# Patient Record
Sex: MALE | NOT HISPANIC OR LATINO | Employment: FULL TIME | URBAN - METROPOLITAN AREA
[De-identification: names, ages, dates, MRNs, and addresses within clinical notes are randomized per-mention and may not be internally consistent; named-entity substitution may affect disease eponyms.]

---

## 2018-03-02 ENCOUNTER — OFFICE VISIT (OUTPATIENT)
Dept: INTERNAL MEDICINE | Facility: CLINIC | Age: 33
End: 2018-03-02
Payer: COMMERCIAL

## 2018-03-02 VITALS
HEIGHT: 68 IN | TEMPERATURE: 98.4 F | OXYGEN SATURATION: 94 % | WEIGHT: 220 LBS | SYSTOLIC BLOOD PRESSURE: 114 MMHG | HEART RATE: 83 BPM | DIASTOLIC BLOOD PRESSURE: 76 MMHG | BODY MASS INDEX: 33.34 KG/M2

## 2018-03-02 DIAGNOSIS — Z13.6 CARDIOVASCULAR SCREENING; LDL GOAL LESS THAN 130: ICD-10-CM

## 2018-03-02 DIAGNOSIS — Z00.00 ROUTINE GENERAL MEDICAL EXAMINATION AT A HEALTH CARE FACILITY: Primary | ICD-10-CM

## 2018-03-02 DIAGNOSIS — F17.200 SMOKER: ICD-10-CM

## 2018-03-02 PROCEDURE — 36415 COLL VENOUS BLD VENIPUNCTURE: CPT | Performed by: NURSE PRACTITIONER

## 2018-03-02 PROCEDURE — 80053 COMPREHEN METABOLIC PANEL: CPT | Performed by: NURSE PRACTITIONER

## 2018-03-02 PROCEDURE — 80061 LIPID PANEL: CPT | Performed by: NURSE PRACTITIONER

## 2018-03-02 PROCEDURE — 99385 PREV VISIT NEW AGE 18-39: CPT | Performed by: NURSE PRACTITIONER

## 2018-03-02 NOTE — PROGRESS NOTES
SUBJECTIVE:   CC: Hal Landa is an 32 year old male who presents for preventative health visit.     Physical   Annual:     Getting at least 3 servings of Calcium per day::  Yes    Bi-annual eye exam::  NO    Dental care twice a year::  NO    Sleep apnea or symptoms of sleep apnea::  Excessive snoring    Diet::  Regular (no restrictions)    Taking medications regularly::  Yes    Medication side effects::  None    Additional concerns today::  No            Needs biometric screen done   No complaints       Smoker   Not willing to quit  Discussion had regarding options and effect second hand smoke has on his 3 children   Wife wants to have him quit           Today's PHQ-2 Score:   PHQ-2 ( 1999 [employer]) 3/2/2018   Q1: Little interest or pleasure in doing things 0   Q2: Feeling down, depressed or hopeless 0   PHQ-2 Score 0   Q1: Little interest or pleasure in doing things Not at all   Q2: Feeling down, depressed or hopeless Not at all   PHQ-2 Score 0       Abuse: Current or Past(Physical, Sexual or Emotional)- No  Do you feel safe in your environment - Yes    Social History   Substance Use Topics     Smoking status: Current Every Day Smoker     Packs/day: 0.25     Smokeless tobacco: Never Used     Alcohol use Yes      Comment: sometimes     Alcohol Use 3/2/2018   AUDIT SCORE  7       Last PSA: No results found for: PSA    Reviewed orders with patient. Reviewed health maintenance and updated orders accordingly - Yes      Reviewed and updated as needed this visit by clinical staff  Tobacco  Allergies  Meds  Med Hx  Surg Hx  Fam Hx  Soc Hx        Reviewed and updated as needed this visit by Provider  Allergies            Review of Systems  C: NEGATIVE for fever, chills, change in weight  I: NEGATIVE for worrisome rashes, moles or lesions  E: NEGATIVE for vision changes or irritation  ENT: NEGATIVE for ear, mouth and throat problems  R: NEGATIVE for significant cough or SOB  CV: NEGATIVE for chest pain,  "palpitations or peripheral edema  GI: NEGATIVE for nausea, abdominal pain, heartburn, or change in bowel habits   male: negative for dysuria, hematuria, decreased urinary stream, erectile dysfunction, urethral discharge  M: NEGATIVE for significant arthralgias or myalgia  N: NEGATIVE for weakness, dizziness or paresthesias  P: NEGATIVE for changes in mood or affect    OBJECTIVE:   /76 (BP Location: Left arm, Patient Position: Sitting, Cuff Size: Adult Large)  Pulse 83  Temp 98.4  F (36.9  C) (Oral)  Ht 5' 8\" (1.727 m)  Wt 220 lb (99.8 kg)  SpO2 94%  BMI 33.45 kg/m2    Physical Exam  GENERAL: alert and no distress  EYES: Eyes grossly normal to inspection,  and conjunctivae and sclerae normal  HENT: ear canals and TM's normal, nose and mouth without ulcers or lesions  NECK: no adenopathy, no asymmetry, masses, or scars and thyroid normal to palpation  RESP: lungs clear to auscultation - no rales, rhonchi or wheezes  CV: regular rate and rhythm, normal S1 S2, no S3 or S4, no murmur, click or rub, no peripheral edema   ABDOMEN: soft, nontender, no hepatosplenomegaly, no masses and bowel sounds normal  MS: no gross musculoskeletal defects noted, no edema  SKIN: no suspicious lesions or rashes  NEURO: Normal strength and tone, mentation intact and speech normal  PSYCH: mentation appears normal, affect normal/bright    ASSESSMENT/PLAN:       ICD-10-CM    1. Routine general medical examination at a health care facility Z00.00 Lipid panel reflex to direct LDL Fasting     Comprehensive metabolic panel   2. Smoker F17.200    3. CARDIOVASCULAR SCREENING; LDL GOAL LESS THAN 130 Z13.6          COUNSELING:   Reviewed preventive health counseling, as reflected in patient instructions       Regular exercise       Healthy diet/nutrition       Osteoporosis Prevention/Bone Health         reports that he has been smoking.  He has been smoking about 0.25 packs per day. He has never used smokeless tobacco.  Tobacco Cessation " "Action Plan: Information offered: Patient not interested at this time  Estimated body mass index is 33.45 kg/(m^2) as calculated from the following:    Height as of this encounter: 5' 8\" (1.727 m).    Weight as of this encounter: 220 lb (99.8 kg).   Weight management plan: Discussed healthy diet and exercise guidelines and patient will follow up in 12 months in clinic to re-evaluate.    Counseling Resources:  ATP IV Guidelines  Pooled Cohorts Equation Calculator  FRAX Risk Assessment  ICSI Preventive Guidelines  Dietary Guidelines for Americans, 2010  USDA's MyPlate  ASA Prophylaxis  Lung CA Screening    ANNE Perez [facility]  Answers for HPI/ROS submitted by the patient on 3/2/2018   PHQ-2 Score: 0    "

## 2018-03-02 NOTE — MR AVS SNAPSHOT
"              After Visit Summary   3/2/2018    Hal Landa    MRN: [id number]           Patient Information     Date Of Birth          1985        Visit Information        Provider Department      3/2/2018 10:40 AM Deborah Chacon APRN CNP [facility]        Today's Diagnoses     Routine general medical examination at a health care facility    -  1    Smoker        CARDIOVASCULAR SCREENING; LDL GOAL LESS THAN 130          Care Instructions    Lab in [address]          Follow-ups after your visit        Who to contact     If you have questions or need follow up information about today's clinic visit or your schedule please contact [facility] directly at [phone].  Normal or non-critical lab and imaging results will be communicated to you by MyChart, letter or phone within 4 business days after the clinic has received the results. If you do not hear from us within 7 days, please contact the clinic through MyChart or phone. If you have a critical or abnormal lab result, we will notify you by phone as soon as possible.  Submit refill requests through [employer] or call your pharmacy and they will forward the refill request to us. Please allow 3 business days for your refill to be completed.          Additional Information About Your Visit        MyChart Information     [employer] lets you send messages to your doctor, view your test results, renew your prescriptions, schedule appointments and more. To sign up, go to [email or web address] . Click on \"Log in\" on the left side of the screen, which will take you to the Welcome page. Then click on \"Sign up Now\" on the right side of the page.     You will be asked to enter the access code listed below, as well as some personal information. Please follow the directions to create your username and password.     Your access code is: [email or web address]  Expires: 2018 11:03 AM     Your access code will  in 90 days. If you " "need help or a new code, please call your [place] clinic or [phone].        Care EveryWhere ID     This is your Care EveryWhere ID. This could be used by other organizations to access your [place] medical records  [id number]        Your Vitals Were     Pulse Temperature Height Pulse Oximetry BMI (Body Mass Index)       83 98.4  F (36.9  C) (Oral) 5' 8\" (1.727 m) 94% 33.45 kg/m2        Blood Pressure from Last 3 Encounters:   03/02/18 114/76   10/30/11 125/79    Weight from Last 3 Encounters:   03/02/18 220 lb (99.8 kg)   10/30/11 185 lb (83.9 kg)              We Performed the Following     Comprehensive metabolic panel     Lipid panel reflex to direct LDL Fasting        Primary Care Provider Office Phone # Fax #    Deborah Zeng Ines, APRN [facility] [phone]       [address] [facility] [address]        Equal Access to Services     [facility] KATHERINE : Hadii aad ku hadasho Soomaali, waaxda luqadaha, qaybta kaalmada adeegyada, waxay idiin hayaan ashleyeg rubinaaratolu demarco . So [facility] [phone].    ATENCIÓN: Si habla español, tiene a vásquez disposición servicios gratuitos de asistencia lingüística. Llame al [phone].    We comply with applicable federal civil rights laws and [place] laws. We do not discriminate on the basis of race, color, national origin, age, disability, sex, sexual orientation, or gender identity.            Thank you!     Thank you for choosing [facility]  for your care. Our goal is always to provide you with excellent care. Hearing back from our patients is one way we can continue to improve our services. Please take a few minutes to complete the written survey that you may receive in the mail after your visit with us. Thank you!             Your Updated Medication List - Protect others around you: Learn how to safely use, store and throw away your medicines at www.disposemymeds.org.      Notice  As of 3/2/2018 11:03 AM    You have not been prescribed any " medications.

## 2018-03-02 NOTE — NURSING NOTE
"Chief Complaint   Patient presents with     Physical       Initial /76 (BP Location: Left arm, Patient Position: Sitting, Cuff Size: Adult Large)  Pulse 83  Temp 98.4  F (36.9  C) (Oral)  Ht 5' 8\" (1.727 m)  Wt 220 lb (99.8 kg)  SpO2 94%  BMI 33.45 kg/m2 Estimated body mass index is 33.45 kg/(m^2) as calculated from the following:    Height as of this encounter: 5' 8\" (1.727 m).    Weight as of this encounter: 220 lb (99.8 kg).  Medication Reconciliation: complete    "

## 2018-03-03 ENCOUNTER — TELEPHONE (OUTPATIENT)
Dept: INTERNAL MEDICINE | Facility: CLINIC | Age: 33
End: 2018-03-03

## 2018-03-03 LAB
ALBUMIN SERPL-MCNC: 4.2 G/DL (ref 3.4–5)
ALP SERPL-CCNC: 84 U/L (ref 40–150)
ALT SERPL W P-5'-P-CCNC: 551 U/L (ref 0–70)
ANION GAP SERPL CALCULATED.3IONS-SCNC: 8 MMOL/L (ref 3–14)
AST SERPL W P-5'-P-CCNC: 221 U/L (ref 0–45)
BILIRUB SERPL-MCNC: 0.4 MG/DL (ref 0.2–1.3)
BUN SERPL-MCNC: 11 MG/DL (ref 7–30)
CALCIUM SERPL-MCNC: 9 MG/DL (ref 8.5–10.1)
CHLORIDE SERPL-SCNC: 106 MMOL/L (ref 94–109)
CHOLEST SERPL-MCNC: 197 MG/DL
CO2 SERPL-SCNC: 26 MMOL/L (ref 20–32)
CREAT SERPL-MCNC: 0.78 MG/DL (ref 0.66–1.25)
GFR SERPL CREATININE-BSD FRML MDRD: >90 ML/MIN/1.7M2
GLUCOSE SERPL-MCNC: 116 MG/DL (ref 70–99)
HDLC SERPL-MCNC: 48 MG/DL
LDLC SERPL CALC-MCNC: 105 MG/DL
NONHDLC SERPL-MCNC: 149 MG/DL
POTASSIUM SERPL-SCNC: 4 MMOL/L (ref 3.4–5.3)
PROT SERPL-MCNC: 8.2 G/DL (ref 6.8–8.8)
SODIUM SERPL-SCNC: 140 MMOL/L (ref 133–144)
TRIGL SERPL-MCNC: 221 MG/DL

## 2018-03-03 NOTE — TELEPHONE ENCOUNTER
MD on call  Lab called that ALT is 551.    We will wait for Deborah Chacon NP to review the results.  She is back in the office on March 6.    I reviewed the last note.  Patient did not have any abdominal complaints.  EtOH score was 7

## 2018-03-06 DIAGNOSIS — R79.89 ELEVATED LFTS: Primary | ICD-10-CM

## 2018-03-06 DIAGNOSIS — R73.09 ELEVATED GLUCOSE: ICD-10-CM

## 2018-03-07 NOTE — TELEPHONE ENCOUNTER
Called and spoke to wife and directed them to be seen in ER related to labs   She will talk to  and decide

## 2018-03-07 NOTE — TELEPHONE ENCOUNTER
Liver tests are elevated   Need to repeat with no alcohol or tylenol   Also do a liver ultrasound     Does she have any pain in abdomen or any complaints?

## 2018-03-07 NOTE — TELEPHONE ENCOUNTER
Called home/cell number which has been changed or disconnected per recording.  Called wife's number (EC) and left message with her to have pt call back for important message.  EDNA Cruz R.N.

## 2019-03-28 ENCOUNTER — OFFICE VISIT (OUTPATIENT)
Dept: PEDIATRICS | Facility: CLINIC | Age: 34
End: 2019-03-28
Payer: COMMERCIAL

## 2019-03-28 VITALS
WEIGHT: 218.5 LBS | TEMPERATURE: 97.5 F | HEART RATE: 80 BPM | DIASTOLIC BLOOD PRESSURE: 76 MMHG | BODY MASS INDEX: 33.12 KG/M2 | OXYGEN SATURATION: 96 % | HEIGHT: 68 IN | SYSTOLIC BLOOD PRESSURE: 120 MMHG

## 2019-03-28 DIAGNOSIS — E11.9 TYPE 2 DIABETES MELLITUS WITHOUT COMPLICATION, WITHOUT LONG-TERM CURRENT USE OF INSULIN (H): ICD-10-CM

## 2019-03-28 DIAGNOSIS — R74.8 ELEVATED LIVER ENZYMES: Primary | ICD-10-CM

## 2019-03-28 DIAGNOSIS — Z00.00 PREVENTATIVE HEALTH CARE: ICD-10-CM

## 2019-03-28 LAB
ERYTHROCYTE [DISTWIDTH] IN BLOOD BY AUTOMATED COUNT: 12.1 % (ref 10–15)
HBA1C MFR BLD: 6.6 % (ref 0–5.6)
HCT VFR BLD AUTO: 50.4 % (ref 40–53)
HGB BLD-MCNC: 17.3 G/DL (ref 13.3–17.7)
MCH RBC QN AUTO: 31.5 PG (ref 26.5–33)
MCHC RBC AUTO-ENTMCNC: 34.3 G/DL (ref 31.5–36.5)
MCV RBC AUTO: 92 FL (ref 78–100)
PLATELET # BLD AUTO: 273 10E9/L (ref 150–450)
RBC # BLD AUTO: 5.5 10E12/L (ref 4.4–5.9)
WBC # BLD AUTO: 8 10E9/L (ref 4–11)

## 2019-03-28 PROCEDURE — 80061 LIPID PANEL: CPT | Performed by: FAMILY MEDICINE

## 2019-03-28 PROCEDURE — 85027 COMPLETE CBC AUTOMATED: CPT | Performed by: FAMILY MEDICINE

## 2019-03-28 PROCEDURE — 36415 COLL VENOUS BLD VENIPUNCTURE: CPT | Performed by: FAMILY MEDICINE

## 2019-03-28 PROCEDURE — 99395 PREV VISIT EST AGE 18-39: CPT | Performed by: FAMILY MEDICINE

## 2019-03-28 PROCEDURE — 82977 ASSAY OF GGT: CPT | Performed by: FAMILY MEDICINE

## 2019-03-28 PROCEDURE — 80053 COMPREHEN METABOLIC PANEL: CPT | Performed by: FAMILY MEDICINE

## 2019-03-28 PROCEDURE — 83036 HEMOGLOBIN GLYCOSYLATED A1C: CPT | Performed by: FAMILY MEDICINE

## 2019-03-28 PROCEDURE — 99213 OFFICE O/P EST LOW 20 MIN: CPT | Mod: 25 | Performed by: FAMILY MEDICINE

## 2019-03-28 ASSESSMENT — MIFFLIN-ST. JEOR: SCORE: 1900.61

## 2019-03-28 NOTE — PROGRESS NOTES
SUBJECTIVE:   CC: Hal Landa is an 33 year old male who presents for preventive health visit.     Healthy Habits:    Do you get at least three servings of calcium containing foods daily (dairy, green leafy vegetables, etc.)? Yes at least 2 servings     Amount of exercise or daily activities, outside of work: none    Problems taking medications regularly No    Medication side effects: No    Have you had an eye exam in the past two years? no    Do you see a dentist twice per year? Yes at least once a year    Do you have sleep apnea, excessive snoring or daytime drowsiness?no    1) elevated liver enzymes last year - opted not to do the imaging.   Discussed alcohol intake previously approximately 2 days a week of having more than 7 beers - discussed . Denies yellowing of the eyes/skin.   Was born in [place]. No known family members with hepatitis.     2) Would like physician screening form done today for work. Had coffee with creamer this morning a few hours ago.     Today's PHQ-2 Score:   PHQ-2 ( 1999 Pfizer) 3/28/2019 3/2/2018   Q1: Little interest or pleasure in doing things 0 0   Q2: Feeling down, depressed or hopeless 0 0   PHQ-2 Score 0 0   Q1: Little interest or pleasure in doing things - Not at all   Q2: Feeling down, depressed or hopeless - Not at all   PHQ-2 Score Incomplete 0     Abuse: Current or Past(Physical, Sexual or Emotional)- No  Do you feel safe in your environment? Yes    Social History     Tobacco Use     Smoking status: Current Every Day Smoker     Packs/day: 0.25     Smokeless tobacco: Never Used   Substance Use Topics     Alcohol use: Yes     Comment: sometimes     If you drink alcohol do you typically have >3 drinks per day or >7 drinks per week? No                      Last PSA: No results found for: PSA    Reviewed orders with patient. Reviewed health maintenance and updated orders accordingly - Yes  Labs reviewed in EPIC    Reviewed and updated as needed this visit by clinical  "staff  Tobacco  Allergies  Meds  Med Hx  Surg Hx  Fam Hx  Soc Hx        Reviewed and updated as needed this visit by Provider           ROS:  CONSTITUTIONAL: NEGATIVE for fever, chills, change in weight  INTEGUMENTARY/SKIN: NEGATIVE for worrisome rashes, moles or lesions  EYES: NEGATIVE for vision changes or irritation  ENT: NEGATIVE for ear, mouth and throat problems  RESP: NEGATIVE for significant cough or SOB  CV: NEGATIVE for chest pain, palpitations or peripheral edema  GI: NEGATIVE for nausea, abdominal pain, heartburn, or change in bowel habits   male: negative for dysuria, hematuria, decreased urinary stream, erectile dysfunction, urethral discharge  MUSCULOSKELETAL: NEGATIVE for significant arthralgias or myalgia  NEURO: NEGATIVE for weakness, dizziness or paresthesias  PSYCHIATRIC: NEGATIVE for changes in mood or affect    OBJECTIVE:   /76 (BP Location: Right arm, Patient Position: Chair, Cuff Size: Adult Large)   Pulse 80   Temp 97.5  F (36.4  C) (Oral)   Ht 1.727 m (5' 8\")   Wt 99.1 kg (218 lb 8 oz)   SpO2 96%   BMI 33.22 kg/m    EXAM:  GENERAL: healthy, alert and no distress  EYES: Eyes grossly normal to inspection, PERRL and conjunctivae and sclerae normal  HENT: normal external ears and  nose and mouth without ulcers or lesions  NECK: no asymmetry, masses, or scars and thyroid normal to palpation  RESP: lungs clear to auscultation - no rales, rhonchi or wheezes  CV: RRR no m/r/g  ABDOMEN: soft, obese, no hepatosplenomegaly  MS: no gross musculoskeletal defects noted, no edema  SKIN: no suspicious lesions or rashes  NEURO: Normal strength and tone, mentation intact and speech normal, CN II-XII intact  PSYCH: mentation appears normal, affect normal/bright      Results for orders placed or performed in visit on 03/28/19   Hemoglobin A1c   Result Value Ref Range    Hemoglobin A1C 6.6 (H) 0 - 5.6 %   CBC with platelets   Result Value Ref Range    WBC 8.0 4.0 - 11.0 10e9/L    RBC Count " "5.50 4.4 - 5.9 10e12/L    Hemoglobin 17.3 13.3 - 17.7 g/dL    Hematocrit 50.4 40.0 - 53.0 %    MCV 92 78 - 100 fl    MCH 31.5 26.5 - 33.0 pg    MCHC 34.3 31.5 - 36.5 g/dL    RDW 12.1 10.0 - 15.0 %    Platelet Count 273 150 - 450 10e9/L        ASSESSMENT/PLAN:   1. Elevated liver enzymes  Patient with elevated liver enzymes a year ago unfortunately he opted not to follow through with ultrasound of his liver upon taking history it appears that binge drinking has been a problem for him but he currently reports that he is drinking much less.  We will recheck his enzymes today if they are to be elevated will reach out to patient to have imaging done his wife has encouraged that we contact her directly as she is more likely to  the phone.  - Comprehensive metabolic panel (BMP + Alb, Alk Phos, ALT, AST, Total. Bili, TP)    2. Preventative health care  Patient is a current smoker but has no interest in quitting.   - Comprehensive metabolic panel (BMP + Alb, Alk Phos, ALT, AST, Total. Bili, TP)  - Hemoglobin A1c    3. Type 2 Diabetes - New Diagnosis  -- A1c of 6.6 - Patient has poor to fair insight on his health -- Would benefit from diabetes educator regarding his condition. We briefly discussed his elevated BMI and recommendation of increasing physical activity.  Patient would benefit from smoking cessation as this increases his risk of atherosclerotic disease.    COUNSELING:  Reviewed preventive health counseling, as reflected in patient instructions       Regular exercise       Healthy diet/nutrition       Alcohol Use    BP Readings from Last 1 Encounters:   03/28/19 120/76     Estimated body mass index is 33.22 kg/m  as calculated from the following:    Height as of this encounter: 1.727 m (5' 8\").    Weight as of this encounter: 99.1 kg (218 lb 8 oz).      Weight management plan: Discussed healthy diet and exercise guidelines recommending follow-up appointment as he is a newly diagnosed Type 2 Diabetic     " reports that he has been smoking.  He has been smoking about 0.25 packs per day. he has never used smokeless tobacco.  Tobacco Cessation Action Plan: Information offered: Patient not interested at this time    Counseling Resources:  ATP IV Guidelines  Pooled Cohorts Equation Calculator  FRAX Risk Assessment  ICSI Preventive Guidelines  Dietary Guidelines for Americans, 2010  [employer]'s MyPlate  ASA Prophylaxis  Lung CA Screening    Errol Sotomayor MD  [facility]

## 2019-03-28 NOTE — PATIENT INSTRUCTIONS
We will call you with the results of your tests     Preventive Health Recommendations  Male Ages 26 - 39    Yearly exam:             See your health care provider every year in order to  o   Review health changes.   o   Discuss preventive care.    o   Review your medicines if your doctor has prescribed any.    You should be tested each year for STDs (sexually transmitted diseases), if you re at risk.     After age 35, talk to your provider about cholesterol testing. If you are at risk for heart disease, have your cholesterol tested at least every 5 years.     If you are at risk for diabetes, you should have a diabetes test (fasting glucose).  Shots: Get a flu shot each year. Get a tetanus shot every 10 years.     Nutrition:    Eat at least 5 servings of fruits and vegetables daily.     Eat whole-grain bread, whole-wheat pasta and brown rice instead of white grains and rice.     Get adequate Calcium and Vitamin D.     Lifestyle    Exercise for at least 150 minutes a week (30 minutes a day, 5 days a week). This will help you control your weight and prevent disease.     Limit alcohol to one drink per day.     No smoking.     Wear sunscreen to prevent skin cancer.     See your dentist every six months for an exam and cleaning.

## 2019-03-28 NOTE — LETTER
4/11/2019     Hal Landa  [address] [facility] [address] [facility] [address]      Dear Hal:    Thank you for allowing me to participate in your care. Your recent test results were reviewed and listed below.      Your hemoglobin A1c levels are elevated and suggest now that you are a Type 2 diabetic. I would like you to come in to be seen by your primary care doctor to discuss this diagnosis and also meet with one of our diabetes educators to discuss ways to manage this condition    Regarding your liver tests that we re-checked  : your labs are improved but you still have an elevation in one of your enzymes . I'd like you to discuss this with your doctor as we may need to do more testing.     Your results are provided below for your review  Results for orders placed or performed in visit on 03/28/19   Comprehensive metabolic panel (BMP + Alb, Alk Phos, ALT, AST, Total. Bili, TP)   Result Value Ref Range    Sodium 141 133 - 144 mmol/L    Potassium 4.4 3.4 - 5.3 mmol/L    Chloride 107 94 - 109 mmol/L    Carbon Dioxide 23 20 - 32 mmol/L    Anion Gap 11 3 - 14 mmol/L    Glucose 103 (H) 70 - 99 mg/dL    Urea Nitrogen 17 7 - 30 mg/dL    Creatinine 0.76 0.66 - 1.25 mg/dL    GFR Estimate >90 >60 mL/min/[1.73_m2]    GFR Estimate If Black >90 >60 mL/min/[1.73_m2]    Calcium 9.6 8.5 - 10.1 mg/dL    Bilirubin Total 0.6 0.2 - 1.3 mg/dL    Albumin 4.4 3.4 - 5.0 g/dL    Protein Total 8.1 6.8 - 8.8 g/dL    Alkaline Phosphatase 70 40 - 150 U/L    ALT 95 (H) 0 - 70 U/L    AST 42 0 - 45 U/L   Hemoglobin A1c   Result Value Ref Range    Hemoglobin A1C 6.6 (H) 0 - 5.6 %   CBC with platelets   Result Value Ref Range    WBC 8.0 4.0 - 11.0 10e9/L    RBC Count 5.50 4.4 - 5.9 10e12/L    Hemoglobin 17.3 13.3 - 17.7 g/dL    Hematocrit 50.4 40.0 - 53.0 %    MCV 92 78 - 100 fl    MCH 31.5 26.5 - 33.0 pg    MCHC 34.3 31.5 - 36.5 g/dL    RDW 12.1 10.0 - 15.0 %    Platelet Count 273 150 - 450 10e9/L   GGT   Result Value Ref  Range    GGT 45 0 - 75 U/L   Lipid panel reflex to direct LDL Non-fasting   Result Value Ref Range    Cholesterol 172 <200 mg/dL    Triglycerides 95 <150 mg/dL    HDL Cholesterol 44 >39 mg/dL    LDL Cholesterol Calculated 109 (H) <100 mg/dL    Non HDL Cholesterol 128 <130 mg/dL       Thank you for choosing [facility]. As a result, please continue with the treatment plan discussed in the office. Return as discussed or sooner if symptoms worsens or fail to improve. If you have any further questions or concerns, please do not hesitate to contact us.      Sincerely,    Errol Sotomayor MD  [address] [address]  Phone: [phone]  Fax: [phone]

## 2019-03-29 LAB
ALBUMIN SERPL-MCNC: 4.4 G/DL (ref 3.4–5)
ALP SERPL-CCNC: 70 U/L (ref 40–150)
ALT SERPL W P-5'-P-CCNC: 95 U/L (ref 0–70)
ANION GAP SERPL CALCULATED.3IONS-SCNC: 11 MMOL/L (ref 3–14)
AST SERPL W P-5'-P-CCNC: 42 U/L (ref 0–45)
BILIRUB SERPL-MCNC: 0.6 MG/DL (ref 0.2–1.3)
BUN SERPL-MCNC: 17 MG/DL (ref 7–30)
CALCIUM SERPL-MCNC: 9.6 MG/DL (ref 8.5–10.1)
CHLORIDE SERPL-SCNC: 107 MMOL/L (ref 94–109)
CHOLEST SERPL-MCNC: 172 MG/DL
CO2 SERPL-SCNC: 23 MMOL/L (ref 20–32)
CREAT SERPL-MCNC: 0.76 MG/DL (ref 0.66–1.25)
GFR SERPL CREATININE-BSD FRML MDRD: >90 ML/MIN/{1.73_M2}
GGT SERPL-CCNC: 45 U/L (ref 0–75)
GLUCOSE SERPL-MCNC: 103 MG/DL (ref 70–99)
HDLC SERPL-MCNC: 44 MG/DL
LDLC SERPL CALC-MCNC: 109 MG/DL
NONHDLC SERPL-MCNC: 128 MG/DL
POTASSIUM SERPL-SCNC: 4.4 MMOL/L (ref 3.4–5.3)
PROT SERPL-MCNC: 8.1 G/DL (ref 6.8–8.8)
SODIUM SERPL-SCNC: 141 MMOL/L (ref 133–144)
TRIGL SERPL-MCNC: 95 MG/DL

## 2019-05-01 ENCOUNTER — ALLIED HEALTH/NURSE VISIT (OUTPATIENT)
Dept: EDUCATION SERVICES | Facility: CLINIC | Age: 34
End: 2019-05-01
Payer: COMMERCIAL

## 2019-05-01 DIAGNOSIS — E11.9 TYPE 2 DIABETES MELLITUS WITHOUT COMPLICATION, WITHOUT LONG-TERM CURRENT USE OF INSULIN (H): Primary | ICD-10-CM

## 2019-05-01 PROCEDURE — G0108 DIAB MANAGE TRN  PER INDIV: HCPCS

## 2019-05-01 NOTE — PROGRESS NOTES
"Diabetes Self-Management Education & Support    Diabetes Education Self Management & Training    SUBJECTIVE/OBJECTIVE:  Presents for: Initial Assessment for new diagnosis  Accompanied by: Spouse  Diabetes education in the past 24mo: No  Focus of Visit: Patient Unsure  Diabetes type: Type 2  Diabetes management related comments/concerns: this diagnostic is new for me  Other concerns:: None  Cultural Influences/Ethnic Background:  American      Diabetes Symptoms & Complications  Blurred vision: No  Fatigue: No  Foot ulcerations: No  Polydipsia: No  Polyphagia: No  Polyuria: No  Visual change: No  Weakness: No  Weight loss: No  Slow healing wounds: No  Autonomic neuropathy: No  CVA: No  Heart disease: No  Nephropathy: No  Peripheral neuropathy: No  Peripheral Vascular Disease: No  Retinopathy: No  Sexual dysfunction: No    Patient Problem List and Family Medical History reviewed for relevant medical history, current medical status, and diabetes risk factors.    Vitals:  There were no vitals taken for this visit.  Estimated body mass index is 33.22 kg/m  as calculated from the following:    Height as of 3/28/19: 1.727 m (5' 8\").    Weight as of 3/28/19: 99.1 kg (218 lb 8 oz).   Last 3 BP:   BP Readings from Last 3 Encounters:   03/28/19 120/76   03/02/18 114/76   10/30/11 125/79       History   Smoking Status     Current Every Day Smoker     Packs/day: 0.25   Smokeless Tobacco     Never Used       Labs:  Lab Results   Component Value Date    A1C 6.6 03/28/2019     Lab Results   Component Value Date     03/28/2019     Lab Results   Component Value Date     03/28/2019     HDL Cholesterol   Date Value Ref Range Status   03/28/2019 44 >39 mg/dL Final   ]  GFR Estimate   Date Value Ref Range Status   03/28/2019 >90 >60 mL/min/[1.73_m2] Final     Comment:     Non  GFR Calc  Starting 12/18/2018, serum creatinine based estimated GFR (eGFR) will be   calculated using the Chronic Kidney Disease " Epidemiology Collaboration   (CKD-EPI) equation.       GFR Estimate If Black   Date Value Ref Range Status   03/28/2019 >90 >60 mL/min/[1.73_m2] Final     Comment:      GFR Calc  Starting 12/18/2018, serum creatinine based estimated GFR (eGFR) will be   calculated using the Chronic Kidney Disease Epidemiology Collaboration   (CKD-EPI) equation.       Lab Results   Component Value Date    CR 0.76 03/28/2019     No results found for: MICROALBUMIN    Healthy Eating  Healthy Eating Assessed Today: (P) Yes  Cultural/Faith diet restrictions?: (P) No  Meal planning: (P) None  Meals include: (P) Snacks  Breakfast: (P) 1 cup coffee - flavored cream OR pancakes, eggs, milk  Lunch: (P) 1pm: sandwich or salad, water or mountain dew OR mexican food  Dinner: (P) 10pm: 5-6 tortillas, beef, veggies, 1 cup milk  Beverages: (P) Water, Coffee, Milk, Soda, Coffee drinks(1 glass soda daily, 1 cup milk daily)  Has patient met with a dietitian in the past?: (P) No    Being Active  Being Active Assessed Today: (P) Yes  Exercise:: (P) Currently not exercising  Barrier to exercise: (P) Time    Monitoring  Monitoring Assessed Today: (P) No    Did not instruct on BG meter today.    Taking Medications      Taking Medication Assessed Today: (P) No    Problem Solving  Problem Solving Assessed Today: (P) No  Medical alert: (P) No  Severe weather/disaster plan for diabetes management?: (P) No  DKA prevention plan?: (P) No    Hypoglycemia symptoms  Confusion: (P) No  Dizziness or Light-Headedness: (P) No  Headaches: (P) No  Hunger: (P) No  Mood changes: (P) No  Nervousness/Anxiety: (P) No  Sleepiness: (P) Yes  Speech difficulty: (P) No  Sweats: (P) No  Tremors: (P) No    Hypoglycemia Complications  Blackouts: (P) No  Hospitalization: (P) No    Reducing Risks  Reducing Risks Assessed Today: (P) No  Has dilated eye exam at least once a year?: (P) No  Sees dentist every 6 months?: (P) Yes  Sees podiatrist (foot doctor)?: (P)  No    Healthy Coping  Informal Support system:: (P) Family  Difficulty affording diabetes management supplies?: (P) No  Patient Activation Measure Survey Score:  No flowsheet data found.    ASSESSMENT:  Needs education on lifestyle changes - meal planning and activity - for new diagnosis of Type 2 diabetes.    Meal schedule is erratic - tends to go low periods of time without eating.  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  No time for exercise during the week, but is on his feet all day.  Hx of drinking large amounts of alcohol but states he has abstained for 3 months.        Patient's most recent   Lab Results   Component Value Date    A1C 6.6 03/28/2019    is meeting goal of <7.0    INTERVENTION:   Diabetes knowledge and skills assessment:     Patient is knowledgeable in diabetes management concepts related to: none    Patient needs further education on the following diabetes management concepts: Healthy Eating, Being Active, Problem Solving, Reducing Risks and Healthy Coping    Based on learning assessment above, most appropriate setting for further diabetes education would be: Individual setting.    Education provided today on:  AADE Self-Care Behaviors:  Diabetes Pathophysiology  Healthy Eating: consistency in amount, composition, and timing of food intake, weight reduction, portion control and plate planning method  Being Active: relationship to blood glucose and describe appropriate activity program  Problem Solving: high blood glucose - causes, signs/symptoms, treatment and prevention    Opportunities for ongoing education and support in diabetes-self management were discussed.    Pt verbalized understanding of concepts discussed and recommendations provided today.       Education Materials Provided:  My Plate Planner    PLAN:  See Patient Instructions for co-developed, patient-stated behavior change goals.  AVS printed and provided to patient today. See Follow-Up section for  recommended follow-up.    NACHO Sánchez CDE     Time Spent: 60 minutes  Encounter Type: Individual    Any diabetes medication dose changes were made via the CDE Protocol and Collaborative Practice Agreement with the patient's referring provider. A copy of this encounter was shared with the provider.

## 2019-05-01 NOTE — PATIENT INSTRUCTIONS
MY DIABETES TODAY:    1)  Goal A1C is under <7.0  Mine is:      Lab Results   Component Value Date    A1C 6.6 03/28/2019       2)  Goal LDL (bad cholesterol) under 100  (measured at least yearly)- I am currently at:   Lab Results   Component Value Date     03/28/2019       3)  Goal blood pressure under 130/80- mine was Data Unavailable today    Care Plan:  Meal Plan Recommendation: eat 3 meals a day, have small snacks between meals, if needed, use portion control and use plate planning method  Exercise / activity plan: Increase activity on the weekends  Return to MD in 3-6 months for follow-up for labs    Follow up:  Call ([phone]), e-mail ([email or web address]), or send [facility] message with questions, concerns or if follow-up is needed.     Bring blood glucose meter and logbook with you to all doctor and follow-up appointments.     [facility] for the [facility]:  For Your Diabetes or Nutrition Education Appointments Call:  [phone]   For Diabetes or Nutrition Related Questions Call or Email:   [phone]  [email or web address]  Fax: [phone]   If you need a medication refill please contact your pharmacy. Please allow 3 business days for your refills to be completed.     Instructions for emailing the Diabetes Educators    If you need to communicate a non-urgent message to a Diabetes Educator via email, please send to [email or web address].    Please follow the following email guidelines:    Subject line: Secure: your clinic name (example: Secure: [email or web address])  In the email please include: First name, middle initial, last name and date of birth.    We will be in touch with you within one (1) business day.

## 2021-04-30 ENCOUNTER — OFFICE VISIT (OUTPATIENT)
Dept: INTERNAL MEDICINE | Facility: CLINIC | Age: 36
End: 2021-04-30
Payer: COMMERCIAL

## 2021-04-30 VITALS
WEIGHT: 219 LBS | HEIGHT: 68 IN | HEART RATE: 74 BPM | OXYGEN SATURATION: 98 % | TEMPERATURE: 97.7 F | RESPIRATION RATE: 20 BRPM | SYSTOLIC BLOOD PRESSURE: 118 MMHG | DIASTOLIC BLOOD PRESSURE: 74 MMHG | BODY MASS INDEX: 33.19 KG/M2

## 2021-04-30 DIAGNOSIS — Z11.59 NEED FOR HEPATITIS C SCREENING TEST: ICD-10-CM

## 2021-04-30 DIAGNOSIS — Z00.00 ROUTINE GENERAL MEDICAL EXAMINATION AT A HEALTH CARE FACILITY: Primary | ICD-10-CM

## 2021-04-30 DIAGNOSIS — E11.9 TYPE 2 DIABETES MELLITUS WITHOUT COMPLICATION, WITHOUT LONG-TERM CURRENT USE OF INSULIN (H): ICD-10-CM

## 2021-04-30 LAB
ALBUMIN SERPL-MCNC: 4.1 G/DL (ref 3.4–5)
ALP SERPL-CCNC: 75 U/L (ref 40–150)
ALT SERPL W P-5'-P-CCNC: 56 U/L (ref 0–70)
ANION GAP SERPL CALCULATED.3IONS-SCNC: 1 MMOL/L (ref 3–14)
AST SERPL W P-5'-P-CCNC: 27 U/L (ref 0–45)
BASOPHILS # BLD AUTO: 0 10E9/L (ref 0–0.2)
BASOPHILS NFR BLD AUTO: 0.4 %
BILIRUB SERPL-MCNC: 0.5 MG/DL (ref 0.2–1.3)
BUN SERPL-MCNC: 19 MG/DL (ref 7–30)
CALCIUM SERPL-MCNC: 9.1 MG/DL (ref 8.5–10.1)
CHLORIDE SERPL-SCNC: 106 MMOL/L (ref 94–109)
CHOLEST SERPL-MCNC: 183 MG/DL
CO2 SERPL-SCNC: 30 MMOL/L (ref 20–32)
CREAT SERPL-MCNC: 0.84 MG/DL (ref 0.66–1.25)
CREAT UR-MCNC: 311 MG/DL
DIFFERENTIAL METHOD BLD: NORMAL
EOSINOPHIL # BLD AUTO: 0.5 10E9/L (ref 0–0.7)
EOSINOPHIL NFR BLD AUTO: 7.6 %
ERYTHROCYTE [DISTWIDTH] IN BLOOD BY AUTOMATED COUNT: 12 % (ref 10–15)
GFR SERPL CREATININE-BSD FRML MDRD: >90 ML/MIN/{1.73_M2}
GLUCOSE SERPL-MCNC: 113 MG/DL (ref 70–99)
HBA1C MFR BLD: 5.6 % (ref 0–5.6)
HCT VFR BLD AUTO: 47 % (ref 40–53)
HCV AB SERPL QL IA: NONREACTIVE
HDLC SERPL-MCNC: 45 MG/DL
HGB BLD-MCNC: 16 G/DL (ref 13.3–17.7)
LDLC SERPL CALC-MCNC: 123 MG/DL
LYMPHOCYTES # BLD AUTO: 2.9 10E9/L (ref 0.8–5.3)
LYMPHOCYTES NFR BLD AUTO: 41.8 %
MCH RBC QN AUTO: 30.7 PG (ref 26.5–33)
MCHC RBC AUTO-ENTMCNC: 34 G/DL (ref 31.5–36.5)
MCV RBC AUTO: 90 FL (ref 78–100)
MICROALBUMIN UR-MCNC: 32 MG/L
MICROALBUMIN/CREAT UR: 10.29 MG/G CR (ref 0–17)
MONOCYTES # BLD AUTO: 0.6 10E9/L (ref 0–1.3)
MONOCYTES NFR BLD AUTO: 8.7 %
NEUTROPHILS # BLD AUTO: 2.8 10E9/L (ref 1.6–8.3)
NEUTROPHILS NFR BLD AUTO: 41.5 %
NONHDLC SERPL-MCNC: 138 MG/DL
PLATELET # BLD AUTO: 224 10E9/L (ref 150–450)
POTASSIUM SERPL-SCNC: 4 MMOL/L (ref 3.4–5.3)
PROT SERPL-MCNC: 7.7 G/DL (ref 6.8–8.8)
RBC # BLD AUTO: 5.21 10E12/L (ref 4.4–5.9)
SODIUM SERPL-SCNC: 137 MMOL/L (ref 133–144)
TRIGL SERPL-MCNC: 74 MG/DL
TSH SERPL DL<=0.005 MIU/L-ACNC: 1.8 MU/L (ref 0.4–4)
WBC # BLD AUTO: 6.9 10E9/L (ref 4–11)

## 2021-04-30 PROCEDURE — 83036 HEMOGLOBIN GLYCOSYLATED A1C: CPT | Performed by: NURSE PRACTITIONER

## 2021-04-30 PROCEDURE — 80061 LIPID PANEL: CPT | Performed by: NURSE PRACTITIONER

## 2021-04-30 PROCEDURE — 80050 GENERAL HEALTH PANEL: CPT | Performed by: NURSE PRACTITIONER

## 2021-04-30 PROCEDURE — 99213 OFFICE O/P EST LOW 20 MIN: CPT | Mod: 25 | Performed by: NURSE PRACTITIONER

## 2021-04-30 PROCEDURE — 82043 UR ALBUMIN QUANTITATIVE: CPT | Performed by: NURSE PRACTITIONER

## 2021-04-30 PROCEDURE — 36415 COLL VENOUS BLD VENIPUNCTURE: CPT | Performed by: NURSE PRACTITIONER

## 2021-04-30 PROCEDURE — 99395 PREV VISIT EST AGE 18-39: CPT | Performed by: NURSE PRACTITIONER

## 2021-04-30 PROCEDURE — 86803 HEPATITIS C AB TEST: CPT | Performed by: NURSE PRACTITIONER

## 2021-04-30 ASSESSMENT — ENCOUNTER SYMPTOMS
DIARRHEA: 0
SHORTNESS OF BREATH: 0
EYE PAIN: 0
JOINT SWELLING: 0
DYSURIA: 0
HEARTBURN: 0
PALPITATIONS: 0
FEVER: 0
FREQUENCY: 0
MYALGIAS: 0
COUGH: 0
HEADACHES: 0
PARESTHESIAS: 0
CONSTIPATION: 0
HEMATURIA: 0
ABDOMINAL PAIN: 0
SORE THROAT: 0
WEAKNESS: 0
DIZZINESS: 0
ARTHRALGIAS: 0
CHILLS: 0
HEMATOCHEZIA: 0
NAUSEA: 0
NERVOUS/ANXIOUS: 0

## 2021-04-30 ASSESSMENT — MIFFLIN-ST. JEOR: SCORE: 1892.88

## 2021-04-30 NOTE — NURSING NOTE
"Chief Complaint   Patient presents with     Physical     fasting     initial /74   Pulse 74   Temp 97.7  F (36.5  C) (Oral)   Resp 20   Ht 1.727 m (5' 8\")   Wt 99.3 kg (219 lb)   SpO2 98%   BMI 33.30 kg/m   Estimated body mass index is 33.3 kg/m  as calculated from the following:    Height as of this encounter: 1.727 m (5' 8\").    Weight as of this encounter: 99.3 kg (219 lb)..  bp completed using cuff size large  HECTOR JEFFERS LPN  "

## 2021-04-30 NOTE — PROGRESS NOTES
SUBJECTIVE:   CC: Hal Landa is an 35 year old male who presents for preventative health visit.       Patient has been advised of split billing requirements and indicates understanding:   Healthy Habits:     Getting at least 3 servings of Calcium per day:  NO    Bi-annual eye exam:  NO    Dental care twice a year:  Yes    Sleep apnea or symptoms of sleep apnea:  None    Diet:  Regular (no restrictions)    Frequency of exercise:  None    Taking medications regularly:  Yes    Medication side effects:  Not applicable    PHQ-2 Total Score: 0    Additional concerns today:  No        Today's PHQ-2 Score:   PHQ-2 ( 1999 [employer]) 3/28/2019   Q1: Little interest or pleasure in doing things 0   Q2: Feeling down, depressed or hopeless 0   PHQ-2 Score 0   Q1: Little interest or pleasure in doing things -   Q2: Feeling down, depressed or hopeless -   PHQ-2 Score Incomplete       Abuse: Current or Past(Physical, Sexual or Emotional)- No  Do you feel safe in your environment? Yes    Have you ever done Advance Care Planning? (For example, a Health Directive, POLST, or a discussion with a medical provider or your loved ones about your wishes): No, advance care planning information given to patient to review.  Patient plans to discuss their wishes with loved ones or provider.      Social History     Tobacco Use     Smoking status: Current Every Day Smoker     Packs/day: 0.25     Smokeless tobacco: Never Used   Substance Use Topics     Alcohol use: Yes     Comment: sometimes         Alcohol Use 3/2/2018   AUDIT SCORE  7       Last PSA: No results found for: PSA    Reviewed orders with patient. Reviewed health maintenance and updated orders accordingly - Yes      Reviewed and updated as needed this visit by clinical staff  Tobacco  Allergies  Meds  Problems  Med Hx  Surg Hx  Fam Hx          Reviewed and updated as needed this visit by Provider                    Review of Systems   Constitutional: Negative for chills  and fever.   HENT: Negative for congestion, ear pain, hearing loss and sore throat.    Eyes: Negative for pain and visual disturbance.   Respiratory: Negative for cough and shortness of breath.    Cardiovascular: Negative for chest pain, palpitations and peripheral edema.   Gastrointestinal: Negative for abdominal pain, constipation, diarrhea, heartburn, hematochezia and nausea.   Genitourinary: Negative for discharge, dysuria, frequency, genital sores, hematuria, impotence and urgency.   Musculoskeletal: Negative for arthralgias, joint swelling and myalgias.   Skin: Negative for rash.   Neurological: Negative for dizziness, weakness, headaches and paresthesias.   Psychiatric/Behavioral: Negative for mood changes. The patient is not nervous/anxious.      Fasting for lab    Discussed ACE/ARB, cholesterol medication with diabetes   He is willing      OBJECTIVE:   There were no vitals taken for this visit.    Physical Exam  GENERAL: healthy, alert and no distress  EYES: Eyes grossly normal to inspection, PERRL and conjunctivae and sclerae normal  HENT: ear canals and TM's normal, nose and mouth without ulcers or lesions  NECK: no adenopathy, no asymmetry, masses, or scars and thyroid normal to palpation  RESP: lungs clear to auscultation - no rales, rhonchi or wheezes  CV: regular rate and rhythm, normal S1 S2, no S3 or S4, no murmur, click or rub, no peripheral edema and peripheral pulses strong  ABDOMEN: soft, nontender, no hepatosplenomegaly, no masses and bowel sounds normal   (male): normal male genitalia without lesions or urethral discharge, no hernia  MS: no gross musculoskeletal defects noted, no edema  SKIN: no suspicious lesions or rashes  NEURO: Normal strength and tone, mentation intact and speech normal  PSYCH: mentation appears normal, affect normal/bright    Diagnostic Test Results:  Labs reviewed in Epic  Lab     ASSESSMENT/PLAN:   1. Routine general medical examination at a health care facility    -  "Lipid panel reflex to direct LDL Fasting  - Comprehensive metabolic panel (BMP + Alb, Alk Phos, ALT, AST, Total. Bili, TP)  - TSH with free T4 reflex  - CBC with platelets and differential    2. Type 2 diabetes mellitus without complication, without long-term current use of insulin (H)  He was A1C 6.6 a couple years ago - no treatment  Discussed cholesterol and Ace ARB    will see what lab look like   - Lipid panel reflex to direct LDL Fasting  - Comprehensive metabolic panel (BMP + Alb, Alk Phos, ALT, AST, Total. Bili, TP)  - TSH with free T4 reflex  - Hemoglobin A1c  - CBC with platelets and differential  - Albumin Random Urine Quantitative with Creat Ratio    3. Need for hepatitis C screening test    - Hepatitis C Screen Reflex to HCV RNA Quant and Genotype    Patient has been advised of split billing requirements and indicates understanding:   COUNSELING:   Reviewed preventive health counseling, as reflected in patient instructions       Regular exercise       Healthy diet/nutrition       Consider Hep C screening for all patients one time for ages 18-79 years    Estimated body mass index is 33.22 kg/m  as calculated from the following:    Height as of 3/28/19: 1.727 m (5' 8\").    Weight as of 3/28/19: 99.1 kg (218 lb 8 oz).         He reports that he has been smoking. He has been smoking about 0.25 packs per day. He has never used smokeless tobacco.  Tobacco Cessation Action Plan:         Counseling Resources:  ATP IV Guidelines  Pooled Cohorts Equation Calculator  FRAX Risk Assessment  ICSI Preventive Guidelines  Dietary Guidelines for Americans, 2010  USDA's MyPlate  ASA Prophylaxis  Lung CA Screening    ANNE Perez [facility]  "

## 2021-04-30 NOTE — LETTER
May 4, 2021      Hal Landa  [address] [facility] [address]        Dear Mr.Diaz Landa,    We are writing to inform you of your test results.    Lab acceptable     Resulted Orders   Lipid panel reflex to direct LDL Fasting   Result Value Ref Range    Cholesterol 183 <200 mg/dL    Triglycerides 74 <150 mg/dL      Comment:      Fasting specimen    HDL Cholesterol 45 >39 mg/dL    LDL Cholesterol Calculated 123 (H) <100 mg/dL      Comment:      Above desirable:  100-129 mg/dl  Borderline High:  130-159 mg/dL  High:             160-189 mg/dL  Very high:       >189 mg/dl      Non HDL Cholesterol 138 (H) <130 mg/dL      Comment:      Above Desirable:  130-159 mg/dl  Borderline high:  160-189 mg/dl  High:             190-219 mg/dl  Very high:       >219 mg/dl     Comprehensive metabolic panel (BMP + Alb, Alk Phos, ALT, AST, Total. Bili, TP)   Result Value Ref Range    Sodium 137 133 - 144 mmol/L    Potassium 4.0 3.4 - 5.3 mmol/L    Chloride 106 94 - 109 mmol/L    Carbon Dioxide 30 20 - 32 mmol/L    Anion Gap 1 (L) 3 - 14 mmol/L    Glucose 113 (H) 70 - 99 mg/dL      Comment:      Fasting specimen    Urea Nitrogen 19 7 - 30 mg/dL    Creatinine 0.84 0.66 - 1.25 mg/dL    GFR Estimate >90 >60 mL/min/[1.73_m2]      Comment:      Non  GFR Calc  Starting 12/18/2018, serum creatinine based estimated GFR (eGFR) will be   calculated using the Chronic Kidney Disease Epidemiology Collaboration   (CKD-EPI) equation.      GFR Estimate If Black >90 >60 mL/min/[1.73_m2]      Comment:       GFR Calc  Starting 12/18/2018, serum creatinine based estimated GFR (eGFR) will be   calculated using the Chronic Kidney Disease Epidemiology Collaboration   (CKD-EPI) equation.      Calcium 9.1 8.5 - 10.1 mg/dL    Bilirubin Total 0.5 0.2 - 1.3 mg/dL    Albumin 4.1 3.4 - 5.0 g/dL    Protein Total 7.7 6.8 - 8.8 g/dL    Alkaline Phosphatase 75 40 - 150 U/L    ALT 56 0 - 70 U/L    AST 27 0 - 45 U/L    TSH with free T4 reflex   Result Value Ref Range    TSH 1.80 0.40 - 4.00 mU/L   Hemoglobin A1c   Result Value Ref Range    Hemoglobin A1C 5.6 0 - 5.6 %      Comment:      Normal <5.7% Prediabetes 5.7-6.4%  Diabetes 6.5% or higher - adopted from ADA   consensus guidelines.     CBC with platelets and differential   Result Value Ref Range    WBC 6.9 4.0 - 11.0 10e9/L    RBC Count 5.21 4.4 - 5.9 10e12/L    Hemoglobin 16.0 13.3 - 17.7 g/dL    Hematocrit 47.0 40.0 - 53.0 %    MCV 90 78 - 100 fl    MCH 30.7 26.5 - 33.0 pg    MCHC 34.0 31.5 - 36.5 g/dL    RDW 12.0 10.0 - 15.0 %    Platelet Count 224 150 - 450 10e9/L    % Neutrophils 41.5 %    % Lymphocytes 41.8 %    % Monocytes 8.7 %    % Eosinophils 7.6 %    % Basophils 0.4 %    Absolute Neutrophil 2.8 1.6 - 8.3 10e9/L    Absolute Lymphocytes 2.9 0.8 - 5.3 10e9/L    Absolute Monocytes 0.6 0.0 - 1.3 10e9/L    Absolute Eosinophils 0.5 0.0 - 0.7 10e9/L    Absolute Basophils 0.0 0.0 - 0.2 10e9/L    Diff Method Automated Method    Albumin Random Urine Quantitative with Creat Ratio   Result Value Ref Range    Creatinine Urine 311 mg/dL    Albumin Urine mg/L 32 mg/L    Albumin Urine mg/g Cr 10.29 0 - 17 mg/g Cr   Hepatitis C Screen Reflex to HCV RNA Quant and Genotype   Result Value Ref Range    Hepatitis C Antibody Nonreactive NR^Nonreactive      Comment:      Assay performance characteristics have not been established for newborns,   infants, and children         If you have any questions or concerns, please call the clinic at the number listed above.       Sincerely,      ANNE Perez CNP

## 2021-05-25 NOTE — ADDENDUM NOTE
Addended by: TITO MERRILL on: 3/7/2018 08:29 AM     Modules accepted: Orders     Repair Performed By Another Provider Text (Leave Blank If You Do Not Want): After the tissue was excised the defect was repaired by another provider.

## 2022-02-17 PROBLEM — E11.9 TYPE 2 DIABETES MELLITUS WITHOUT COMPLICATION, WITHOUT LONG-TERM CURRENT USE OF INSULIN (H): Status: ACTIVE | Noted: 2019-03-28

## 2022-06-23 ENCOUNTER — OFFICE VISIT (OUTPATIENT)
Dept: INTERNAL MEDICINE | Facility: CLINIC | Age: 37
End: 2022-06-23
Payer: COMMERCIAL

## 2022-06-23 VITALS
HEIGHT: 68 IN | TEMPERATURE: 98.2 F | DIASTOLIC BLOOD PRESSURE: 88 MMHG | SYSTOLIC BLOOD PRESSURE: 136 MMHG | HEART RATE: 68 BPM | RESPIRATION RATE: 18 BRPM | BODY MASS INDEX: 32.74 KG/M2 | WEIGHT: 216 LBS | OXYGEN SATURATION: 98 %

## 2022-06-23 DIAGNOSIS — Z13.220 SCREENING FOR HYPERLIPIDEMIA: ICD-10-CM

## 2022-06-23 DIAGNOSIS — Z00.00 ANNUAL PHYSICAL EXAM: Primary | ICD-10-CM

## 2022-06-23 DIAGNOSIS — E11.9 TYPE 2 DIABETES MELLITUS WITHOUT COMPLICATION, WITHOUT LONG-TERM CURRENT USE OF INSULIN (H): ICD-10-CM

## 2022-06-23 LAB
ALBUMIN SERPL-MCNC: 4.1 G/DL (ref 3.4–5)
ALP SERPL-CCNC: 78 U/L (ref 40–150)
ALT SERPL W P-5'-P-CCNC: 57 U/L (ref 0–70)
ANION GAP SERPL CALCULATED.3IONS-SCNC: 5 MMOL/L (ref 3–14)
AST SERPL W P-5'-P-CCNC: 34 U/L (ref 0–45)
BASOPHILS # BLD AUTO: 0.1 10E3/UL (ref 0–0.2)
BASOPHILS NFR BLD AUTO: 1 %
BILIRUB SERPL-MCNC: 0.6 MG/DL (ref 0.2–1.3)
BUN SERPL-MCNC: 19 MG/DL (ref 7–30)
CALCIUM SERPL-MCNC: 8.7 MG/DL (ref 8.5–10.1)
CHLORIDE BLD-SCNC: 108 MMOL/L (ref 94–109)
CHOLEST SERPL-MCNC: 182 MG/DL
CO2 SERPL-SCNC: 25 MMOL/L (ref 20–32)
CREAT SERPL-MCNC: 0.77 MG/DL (ref 0.66–1.25)
CREAT UR-MCNC: 193 MG/DL
EOSINOPHIL # BLD AUTO: 0.4 10E3/UL (ref 0–0.7)
EOSINOPHIL NFR BLD AUTO: 7 %
ERYTHROCYTE [DISTWIDTH] IN BLOOD BY AUTOMATED COUNT: 11.9 % (ref 10–15)
FASTING STATUS PATIENT QL REPORTED: YES
GFR SERPL CREATININE-BSD FRML MDRD: >90 ML/MIN/1.73M2
GLUCOSE BLD-MCNC: 116 MG/DL (ref 70–99)
HBA1C MFR BLD: 5.6 % (ref 0–5.6)
HCT VFR BLD AUTO: 47.7 % (ref 40–53)
HDLC SERPL-MCNC: 51 MG/DL
HGB BLD-MCNC: 16.3 G/DL (ref 13.3–17.7)
IMM GRANULOCYTES # BLD: 0 10E3/UL
IMM GRANULOCYTES NFR BLD: 0 %
LDLC SERPL CALC-MCNC: 105 MG/DL
LYMPHOCYTES # BLD AUTO: 2.7 10E3/UL (ref 0.8–5.3)
LYMPHOCYTES NFR BLD AUTO: 45 %
MCH RBC QN AUTO: 31 PG (ref 26.5–33)
MCHC RBC AUTO-ENTMCNC: 34.2 G/DL (ref 31.5–36.5)
MCV RBC AUTO: 91 FL (ref 78–100)
MICROALBUMIN UR-MCNC: 16 MG/L
MICROALBUMIN/CREAT UR: 8.29 MG/G CR (ref 0–17)
MONOCYTES # BLD AUTO: 0.5 10E3/UL (ref 0–1.3)
MONOCYTES NFR BLD AUTO: 9 %
NEUTROPHILS # BLD AUTO: 2.4 10E3/UL (ref 1.6–8.3)
NEUTROPHILS NFR BLD AUTO: 39 %
NONHDLC SERPL-MCNC: 131 MG/DL
PLATELET # BLD AUTO: 241 10E3/UL (ref 150–450)
POTASSIUM BLD-SCNC: 3.9 MMOL/L (ref 3.4–5.3)
PROT SERPL-MCNC: 7.9 G/DL (ref 6.8–8.8)
RBC # BLD AUTO: 5.26 10E6/UL (ref 4.4–5.9)
SODIUM SERPL-SCNC: 138 MMOL/L (ref 133–144)
TRIGL SERPL-MCNC: 129 MG/DL
WBC # BLD AUTO: 6.1 10E3/UL (ref 4–11)

## 2022-06-23 PROCEDURE — 80053 COMPREHEN METABOLIC PANEL: CPT | Performed by: INTERNAL MEDICINE

## 2022-06-23 PROCEDURE — 82043 UR ALBUMIN QUANTITATIVE: CPT | Performed by: INTERNAL MEDICINE

## 2022-06-23 PROCEDURE — 85025 COMPLETE CBC W/AUTO DIFF WBC: CPT | Performed by: INTERNAL MEDICINE

## 2022-06-23 PROCEDURE — 83036 HEMOGLOBIN GLYCOSYLATED A1C: CPT | Performed by: INTERNAL MEDICINE

## 2022-06-23 PROCEDURE — 36415 COLL VENOUS BLD VENIPUNCTURE: CPT | Performed by: INTERNAL MEDICINE

## 2022-06-23 PROCEDURE — 80061 LIPID PANEL: CPT | Performed by: INTERNAL MEDICINE

## 2022-06-23 PROCEDURE — 99207 PR FOOT EXAM NO CHARGE: CPT | Mod: 25 | Performed by: INTERNAL MEDICINE

## 2022-06-23 PROCEDURE — 99395 PREV VISIT EST AGE 18-39: CPT | Performed by: INTERNAL MEDICINE

## 2022-06-23 PROCEDURE — 99213 OFFICE O/P EST LOW 20 MIN: CPT | Mod: 25 | Performed by: INTERNAL MEDICINE

## 2022-06-23 ASSESSMENT — ENCOUNTER SYMPTOMS
PARESTHESIAS: 0
HEADACHES: 0
CONSTIPATION: 0
NAUSEA: 0
MYALGIAS: 0
SHORTNESS OF BREATH: 0
JOINT SWELLING: 1
DIARRHEA: 0
ARTHRALGIAS: 1
JOINT SWELLING: 0
HEMATURIA: 0
DYSURIA: 0
EYE PAIN: 0
WEAKNESS: 0
FEVER: 0
COUGH: 0
PALPITATIONS: 0
FREQUENCY: 0
ARTHRALGIAS: 0
HEMATOCHEZIA: 0
SORE THROAT: 0
NERVOUS/ANXIOUS: 0
CHILLS: 0
ABDOMINAL PAIN: 0
DIZZINESS: 0

## 2022-06-23 NOTE — LETTER
June 24, 2022      Hal Landa  [address] [facility] [address]        Dear Hal Simpson, your recent lab test results have returned.  I am pleased to report that your blood counts, electrolytes, kidney function, and liver enzymes look excellent.  Your hemoglobin A1c was noted to be 5.6, which would indicate that your blood sugar is under good control.  Your cholesterol panels are noted to be at an acceptable level.  I have completed your physical form, and it will be submitted for you.  You should have repeat lab work collected at your next annual physical.       Resulted Orders   Lipid panel reflex to direct LDL Fasting   Result Value Ref Range    Cholesterol 182 <200 mg/dL    Triglycerides 129 <150 mg/dL    Direct Measure HDL 51 >=40 mg/dL    LDL Cholesterol Calculated 105 (H) <=100 mg/dL    Non HDL Cholesterol 131 (H) <130 mg/dL    Patient Fasting > 8hrs? Yes     Narrative    Cholesterol  Desirable:  <200 mg/dL    Triglycerides  Normal:  Less than 150 mg/dL  Borderline High:  150-199 mg/dL  High:  200-499 mg/dL  Very High:  Greater than or equal to 500 mg/dL    Direct Measure HDL  Female:  Greater than or equal to 50 mg/dL   Male:  Greater than or equal to 40 mg/dL    LDL Cholesterol  Desirable:  <100mg/dL  Above Desirable:  100-129 mg/dL   Borderline High:  130-159 mg/dL   High:  160-189 mg/dL   Very High:  >= 190 mg/dL    Non HDL Cholesterol  Desirable:  130 mg/dL  Above Desirable:  130-159 mg/dL  Borderline High:  160-189 mg/dL  High:  190-219 mg/dL  Very High:  Greater than or equal to 220 mg/dL   Hemoglobin A1c   Result Value Ref Range    Hemoglobin A1C 5.6 0.0 - 5.6 %      Comment:      Normal <5.7%   Prediabetes 5.7-6.4%    Diabetes 6.5% or higher     Note: Adopted from ADA consensus guidelines.   Comprehensive metabolic panel (BMP + Alb, Alk Phos, ALT, AST, Total. Bili, TP)   Result Value Ref Range    Sodium 138 133 - 144 mmol/L    Potassium 3.9 3.4 - 5.3 mmol/L     Chloride 108 94 - 109 mmol/L    Carbon Dioxide (CO2) 25 20 - 32 mmol/L    Anion Gap 5 3 - 14 mmol/L    Urea Nitrogen 19 7 - 30 mg/dL    Creatinine 0.77 0.66 - 1.25 mg/dL    Calcium 8.7 8.5 - 10.1 mg/dL    Glucose 116 (H) 70 - 99 mg/dL    Alkaline Phosphatase 78 40 - 150 U/L    AST 34 0 - 45 U/L    ALT 57 0 - 70 U/L    Protein Total 7.9 6.8 - 8.8 g/dL    Albumin 4.1 3.4 - 5.0 g/dL    Bilirubin Total 0.6 0.2 - 1.3 mg/dL    GFR Estimate >90 >60 mL/min/1.73m2      Comment:      Effective December 21, 2021 eGFRcr in adults is calculated using the 2021 CKD-EPI creatinine equation which includes age and gender (Jessika villanueva al., [place], DOI: 10.1056/UHCKzt7816838)   Albumin Random Urine Quantitative with Creat Ratio   Result Value Ref Range    Creatinine Urine mg/dL 193 mg/dL    Albumin Urine mg/L 16 mg/L    Albumin Urine mg/g Cr 8.29 0.00 - 17.00 mg/g Cr   CBC with platelets and differential   Result Value Ref Range    WBC Count 6.1 4.0 - 11.0 10e3/uL    RBC Count 5.26 4.40 - 5.90 10e6/uL    Hemoglobin 16.3 13.3 - 17.7 g/dL    Hematocrit 47.7 40.0 - 53.0 %    MCV 91 78 - 100 fL    MCH 31.0 26.5 - 33.0 pg    MCHC 34.2 31.5 - 36.5 g/dL    RDW 11.9 10.0 - 15.0 %    Platelet Count 241 150 - 450 10e3/uL    % Neutrophils 39 %    % Lymphocytes 45 %    % Monocytes 9 %    % Eosinophils 7 %    % Basophils 1 %    % Immature Granulocytes 0 %    Absolute Neutrophils 2.4 1.6 - 8.3 10e3/uL    Absolute Lymphocytes 2.7 0.8 - 5.3 10e3/uL    Absolute Monocytes 0.5 0.0 - 1.3 10e3/uL    Absolute Eosinophils 0.4 0.0 - 0.7 10e3/uL    Absolute Basophils 0.1 0.0 - 0.2 10e3/uL    Absolute Immature Granulocytes 0.0 <=0.4 10e3/uL       If you have any questions or concerns, please call the clinic at the number listed above.       Sincerely,      Alexys Nieto MD

## 2022-06-23 NOTE — PROGRESS NOTES
SUBJECTIVE:   CC: Hal Landa is an 36 year old male who presents for preventative health visit.       Patient has been advised of split billing requirements and indicates understanding: Yes  Patient is a 36-year-old  male who presents to the clinic for his annual physical.  He has no acute concerns or complaints.  Patient does have a prior history of diabetes mellitus.  His last hemoglobin A1c was noted to be 5.6 in April 2021.  Patient has never been on any medication for management of his blood sugar.  He does not currently take any prescribed medications.  Patient reports a stable appetite.  He is stooling and voiding without issue.  Patient sleeps approximately 6 to 7 hours per night.  He is fasting.  Patient does not wish to have any immunizations at this time.    Healthy Habits:     Getting at least 3 servings of Calcium per day:  Yes    Bi-annual eye exam:  NO    Dental care twice a year:  Yes    Sleep apnea or symptoms of sleep apnea:  Excessive snoring    Diet:  Regular (no restrictions)    Frequency of exercise:  1 day/week    Duration of exercise:  N/A    PHQ-2 Total Score: 0    Additional concerns today:  No    Ability to successfully perform activities of daily living: Yes, no assistance needed  Home safety:  none identified   Hearing impairment: No        Diabetes Follow-up      How often are you checking your blood sugar? Not at all    What concerns do you have today about your diabetes? None     Do you have any of these symptoms? (Select all that apply)  No numbness or tingling in feet.  No redness, sores or blisters on feet.  No complaints of excessive thirst.  No reports of blurry vision.  No significant changes to weight.    Have you had a diabetic eye exam in the last 12 months? No        BP Readings from Last 2 Encounters:   04/30/21 118/74   03/28/19 120/76     Hemoglobin A1C POCT (%)   Date Value   04/30/2021 5.6   03/28/2019 6.6 (H)     LDL Cholesterol Calculated (mg/dL)    Date Value   04/30/2021 123 (H)   03/28/2019 109 (H)         Today's PHQ-2 Score:   PHQ-2 ( 1999 [employer]) 6/23/2022   Q1: Little interest or pleasure in doing things 0   Q2: Feeling down, depressed or hopeless 0   PHQ-2 Score 0   PHQ-2 Total Score (12-17 Years)- Positive if 3 or more points; Administer PHQ-A if positive -   Q1: Little interest or pleasure in doing things Not at all   Q2: Feeling down, depressed or hopeless Not at all   PHQ-2 Score 0       Abuse: Current or Past(Physical, Sexual or Emotional)- No  Do you feel safe in your environment? Yes        Social History     Tobacco Use     Smoking status: Current Every Day Smoker     Packs/day: 0.25     Smokeless tobacco: Never Used   Substance Use Topics     Alcohol use: Yes     Comment: sometimes         Alcohol Use 6/23/2022   Prescreen: >3 drinks/day or >7 drinks/week? Yes   AUDIT SCORE  11       Last PSA: No results found for: PSA    Reviewed orders with patient. Reviewed health maintenance and updated orders accordingly - Yes  Lab work is in process    Reviewed and updated as needed this visit by clinical staff   Tobacco  Allergies  Meds   Med Hx  Surg Hx  Fam Hx  Soc Hx          Reviewed and updated as needed this visit by Provider                       Review of Systems   Constitutional: Negative for chills and fever.   HENT: Negative for congestion, ear pain, hearing loss and sore throat.    Eyes: Negative for pain and visual disturbance.   Respiratory: Negative for cough and shortness of breath.    Cardiovascular: Negative for chest pain, palpitations and peripheral edema.   Gastrointestinal: Negative for abdominal pain, constipation, diarrhea, hematochezia and nausea.   Genitourinary: Negative for dysuria, frequency, genital sores, hematuria, penile discharge and urgency.   Musculoskeletal: Negative for arthralgias, joint swelling and myalgias.   Skin: Negative for rash.   Neurological: Negative for dizziness, weakness, headaches and  "paresthesias.   Psychiatric/Behavioral: Negative for mood changes. The patient is not nervous/anxious.          OBJECTIVE:   Blood pressure 136/88, pulse 68, temperature 98.2  F (36.8  C), resp. rate 18, height 1.727 m (5' 8\"), weight 98 kg (216 lb), SpO2 98 %.        Physical Exam  Constitutional:       General: He is not in acute distress.     Appearance: He is well-developed.   HENT:      Head: Normocephalic and atraumatic.      Right Ear: Tympanic membrane and external ear normal.      Left Ear: Tympanic membrane and external ear normal.      Nose: Nose normal.      Mouth/Throat:      Mouth: No oral lesions.      Pharynx: No oropharyngeal exudate.   Eyes:      General:         Right eye: No discharge.         Left eye: No discharge.      Conjunctiva/sclera: Conjunctivae normal.      Pupils: Pupils are equal, round, and reactive to light.   Neck:      Thyroid: No thyromegaly.      Trachea: No tracheal deviation.   Cardiovascular:      Rate and Rhythm: Normal rate and regular rhythm.      Pulses: Normal pulses.           Dorsalis pedis pulses are 2+ on the right side and 2+ on the left side.        Posterior tibial pulses are 2+ on the right side and 2+ on the left side.      Heart sounds: Normal heart sounds, S1 normal and S2 normal. No murmur heard.    No S3 or S4 sounds.   Pulmonary:      Effort: Pulmonary effort is normal. No respiratory distress.      Breath sounds: Normal breath sounds. No wheezing or rales.   Abdominal:      General: Bowel sounds are normal.      Palpations: Abdomen is soft. There is no mass.      Tenderness: There is no abdominal tenderness.   Musculoskeletal:         General: No deformity. Normal range of motion.      Cervical back: Neck supple.   Feet:      Right foot:      Protective Sensation: 4 sites tested. 4 sites sensed.      Skin integrity: Skin integrity normal.      Left foot:      Protective Sensation: 4 sites tested. 4 sites sensed.      Skin integrity: Skin integrity normal. "   Lymphadenopathy:      Cervical: No cervical adenopathy.   Skin:     General: Skin is warm and dry.      Capillary Refill: Capillary refill takes less than 2 seconds.      Findings: No lesion or rash.   Neurological:      Mental Status: He is alert and oriented to person, place, and time.      Motor: No abnormal muscle tone.      Deep Tendon Reflexes: Reflexes are normal and symmetric.   Psychiatric:         Speech: Speech normal.         Thought Content: Thought content normal.         Judgment: Judgment normal.       Diagnostic Test Results: CMP, CBC, FLP, hemoglobin A1c, and urine microalbumin are pending.    ASSESSMENT/PLAN:       ICD-10-CM    1. Annual physical exam  Z00.00 CBC with platelets and differential     At this time, patient does have a relatively unremarkable physical examination.  His blood pressure is noted to be at an acceptable level.  We did spend some time discussing appropriate dietary and lifestyle modifications necessary to help keep his weight and blood pressure under good control.  Fasting labs are currently pending.  Patient did decline any immunizations.   2. Type 2 diabetes mellitus without complication, without long-term current use of insulin (H)  E11.9  At this time, patient is overdue for follow-up diabetic labs.  He did submit a blood sample today for a repeat hemoglobin A1c as well as a CMP and FLP.  Results are currently pending.  Diabetic foot examination was completed without any abnormalities noted.  We will contact patient with results of his labs once they are available for review.  Patient was encouraged to get his annual eye examination completed.     FOOT EXAM     Hemoglobin A1c     Comprehensive metabolic panel (BMP + Alb, Alk Phos, ALT, AST, Total. Bili, TP)     Albumin Random Urine Quantitative with Creat Ratio                  3. Screening for hyperlipidemia  Z13.220 Lipid panel reflex to direct LDL Fasting is pending.            Patient has been advised of split  "billing requirements and indicates understanding: Yes    COUNSELING:   Reviewed preventive health counseling, as reflected in patient instructions    Estimated body mass index is 33.3 kg/m  as calculated from the following:    Height as of 4/30/21: 1.727 m (5' 8\").    Weight as of 4/30/21: 99.3 kg (219 lb).     Weight management plan: Discussed healthy diet and exercise guidelines    He reports that he has been smoking. He has been smoking about 0.25 packs per day. He has never used smokeless tobacco.  Tobacco Cessation Action Plan:   Information offered: Patient not interested at this time      Counseling Resources:  ATP IV Guidelines  Pooled Cohorts Equation Calculator  FRAX Risk Assessment  ICSI Preventive Guidelines  Dietary Guidelines for Americans, 2010  USDA's MyPlate  ASA Prophylaxis  Lung CA Screening    Alexys Nieto MD  [facility]  "

## 2022-11-21 ENCOUNTER — HEALTH MAINTENANCE LETTER (OUTPATIENT)
Age: 37
End: 2022-11-21

## 2022-12-25 ENCOUNTER — HEALTH MAINTENANCE LETTER (OUTPATIENT)
Age: 37
End: 2022-12-25

## 2023-05-24 ENCOUNTER — PATIENT OUTREACH (OUTPATIENT)
Dept: CARE COORDINATION | Facility: CLINIC | Age: 38
End: 2023-05-24
Payer: COMMERCIAL

## 2023-07-09 ENCOUNTER — HEALTH MAINTENANCE LETTER (OUTPATIENT)
Age: 38
End: 2023-07-09

## 2023-09-17 ENCOUNTER — HEALTH MAINTENANCE LETTER (OUTPATIENT)
Age: 38
End: 2023-09-17

## 2024-02-04 ENCOUNTER — HEALTH MAINTENANCE LETTER (OUTPATIENT)
Age: 39
End: 2024-02-04

## 2024-08-31 ENCOUNTER — HEALTH MAINTENANCE LETTER (OUTPATIENT)
Age: 39
End: 2024-08-31

## 2024-11-09 ENCOUNTER — HEALTH MAINTENANCE LETTER (OUTPATIENT)
Age: 39
End: 2024-11-09

## 2025-03-02 ENCOUNTER — HEALTH MAINTENANCE LETTER (OUTPATIENT)
Age: 40
End: 2025-03-02

## 2025-04-15 ENCOUNTER — HOSPITAL ENCOUNTER (OUTPATIENT)
Dept: BEHAVIORAL HEALTH | Facility: CLINIC | Age: 40
Discharge: HOME | End: 2025-04-15
Attending: PSYCHIATRY & NEUROLOGY | Admitting: PSYCHIATRY & NEUROLOGY
Payer: COMMERCIAL

## 2025-04-15 VITALS — HEIGHT: 73 IN | BODY MASS INDEX: 26.51 KG/M2 | WEIGHT: 200 LBS

## 2025-04-15 DIAGNOSIS — F10.20 ALCOHOL USE DISORDER, SEVERE, DEPENDENCE (H): Primary | ICD-10-CM

## 2025-04-15 PROCEDURE — H0001 ALCOHOL AND/OR DRUG ASSESS: HCPCS

## 2025-04-15 ASSESSMENT — COLUMBIA-SUICIDE SEVERITY RATING SCALE - C-SSRS
1. HAVE YOU WISHED YOU WERE DEAD OR WISHED YOU COULD GO TO SLEEP AND NOT WAKE UP?: NO
ATTEMPT LIFETIME: NO
2. HAVE YOU ACTUALLY HAD ANY THOUGHTS OF KILLING YOURSELF?: NO
6. HAVE YOU EVER DONE ANYTHING, STARTED TO DO ANYTHING, OR PREPARED TO DO ANYTHING TO END YOUR LIFE?: NO
TOTAL  NUMBER OF ABORTED OR SELF INTERRUPTED ATTEMPTS LIFETIME: NO
TOTAL  NUMBER OF INTERRUPTED ATTEMPTS LIFETIME: NO

## 2025-04-15 ASSESSMENT — PATIENT HEALTH QUESTIONNAIRE - PHQ9: SUM OF ALL RESPONSES TO PHQ QUESTIONS 1-9: 3

## 2025-04-15 ASSESSMENT — PAIN SCALES - GENERAL: PAINLEVEL_OUTOF10: NO PAIN (0)

## 2025-04-15 NOTE — PROGRESS NOTES
[facility]  Provider Name:  ALIZA Toribio/[facility]     Telephone: [phone]      PATIENT'S NAME: Hal Landa  PREFERRED NAME: Hal  PRONOUNS: he/him/his    MRN: [id number]  : 1985  ADDRESS:   [address] [place] [address]  E-MAIL: [email or web address]   ACCT. NUMBER:  [id number]  DATE OF SERVICE: April 15, 2025  START TIME: 12:30 pm  END TIME: 1:50 pm  PREFERRED PHONE: [phone]   May we leave a program related message: Yes  SERVICE MODALITY:  In-person:    Last 4 digits of SSN #: [id number]    EMERGENCY CONTACT:  Michael Mehta (spouse)  Tel #: [phone]    UNIVERSAL ADULT SUBSTANCE USE DISORDER DIAGNOSTIC ASSESSMENT    Identifying Information:  The patient is a 39 year old, [place] male.  The patient was referred for an assessment by self and legal.  The patient attended the session with a virtual Namibian  and with his wife.  The patient's primary language is Namibian and he will need a Namibian  for all of his treatment services.     Chief Complaint:   The reason for seeking services at this time is:  The patient reported the reason for participating in the substance use disorder assessment today on 4/15/2025 was due to the patient's own awareness he needed help and due to pending pressure from the legal system.  The precipitating event was the patient reported being arrested for an implied consent DWI in [facility] on 2025, when the patient had refused to submit to alcohol testing.  The patient reported he had been pulled over by police on 2025 secondary to being caught speeding.  The patient reported he had consumed around 5 beers over a 2 hour period of time, after he had gotten off of work, prior to driving on 2025.  The patient reported having 1 prior DWI charge around 7 years ago, after which he reported attending and completing a DWI class.  The patient  reported his heaviest use of alcohol had been during the past 3 years, when he reported a pattern of drinking between 6-8 beers and a quart of whiskey around 4 times per week on average.  The patient reported having some periods of time being sober, including having a 3-month period of time without drinking alcohol in 2020, but he had been unable to maintain long-term abstinence from alcohol.   The patient reported his use of alcohol had really escalated after his father had been kidnapped and had gone missing around 3 years ago, and his father continues to be missing today.  The patient appeared to be willing to follow the recommendation to enter an Intensive Outpatient program at one of the [facility] for substance use disorder treatment.  The patient first had a concern about having substance abuse issues around 3 years ago after his alcohol use had increased after his father had been kidnapped and had gone missing.  The patient reported he had attempted to stop his use of alcohol on her own in the past, but he had been unsuccessful in his attempts to maintain abstinence from alcohol.  The patient denied having any history of participating in a substance use disorder treatment program.  The patient denied having any inpatient detoxification admissions or inpatient hospitalizations for withdrawal symptoms.  The patient is not currently receiving any substance use disorder treatment services.  The patient denied having any history of attending 12-step or other recovery support group meetings.  The patient does not appear to be in severe withdrawal, an imminent safety risk to self or others, or requiring immediate medical attention and may proceed with the assessment interview.    Social/Family History:  The patient reported growing up in [place].  The patient reported coming to the [place] in 2[place].  The patient reported being primarily raised by his mother in [place], as his father had mainly  lived in [place] at that time.  The patient denied experiencing or witnessing any verbal, physical or sexual abuse in the family home.  The patient reported overall his childhood had been a combination of being happy and being challenging, mainly due to being away from his father who had been living in [place] during most of his childhood.  The patient reported feeling supported by his mother when he was growing up.  The patient reported being raised in the [employer] Taoism (Worship).  The patient described current relationships with his family of origin as being good.      The patient describes his cultural background as being a [place] male.  Cultural influences and impact on patient's life structure, values, norms, and healthcare: The patient denied cultural concerns had an impact on life structure, values, norms, or healthcare.  Contextual influences on patient's health include: Family Factors: family history includes Anxiety Disorder in his mother, sister, sister, sister, sister, and sister; Depression in his mother, sister, sister, sister, sister, and sister; Substance Abuse in his brother, father, mother, sister, sister, and sister.   The patient identified his preferred language to be Northern Irish.  The patient reported he does need the assistance of an  or other support involved in therapy.  The patient's primary language is Northern Irish and he will need a Northern Irish  for all of his treatment services.  The patient reported he is rarely involved in community camelia activities.  The patient reported his spirituality would have a positive impact on his recovery.    The patient reported having no significant delays in developmental tasks.  The patient's highest education level was grade school.  The patient identified the following learning problems: none reported.  The patient reports he is not able to understand written materials.  The patient's primary language is Northern Irish and he will need a  Mongolian  for all of his treatment services.      The patient reported the following relationship history: The patient reported being  1 time and he is still  to his wife.  The patient identified as being heterosexual and he reported being  to his wife for the past 24 years.  The patient reported having 3 children, a son age 16, a son age 14 and a daughter age 9.     The patient's current living/housing situation involves staying in own home/apartment.  The patient reported living with his wife and their 3 children and he reported his housing is stable.  The patient denied having any concerns regarding his immediate living environment and/or neighborhood, but he had been unable to maintain abstinence from alcohol while living there.  The patient identified his wife and his mother-in-law as being his primary support network at this time.  The patient identified the quality of his relationships with his support network as being good overall, but somewhat strained due to the patient's substance abuse.  The patient would like the following people involved in treatment services if recommended: his wife.     The patient reported engaging in the following recreational/leisure activities: playing basketball.  The patient reported engaging in the following recreation/leisure activities while using alcohol or other non-prescribed mood altering chemicals: The patient's use of alcohol had been done independently of his social/recreational/leisure activities.  The patient reported the following people are supportive of his recovery: his wife and his mother-in-law.  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  The patient reported his income is obtained from his employment.  The patient denied having any financial stressors at this time.  Cultural and socioeconomic factors do not affect the patient's access to services.    The patient reported the  following substance related arrests or legal issues: The patient reported being arrested for an implied consent DWI in [facility] on 4/4/2025, when the patient had refused to submit to alcohol testing.  The patient reported he had been pulled over by police on 4/4/2025 secondary to being caught speeding.  The patient reported he had consumed around 5 beers over a 2 hour period of time, after he had gotten off of work, prior to driving on 4/4/2025.  The patient reported having 1 prior DWI charge around 7 years ago, after which he reported attending and completing a DWI class.  The patient's current legal charge is pending and the patient had not yet been started on court probation in [facility].    Patient's Strengths and Limitations:  The patient identified the following strengths or resources that will help her succeed in treatment: commitment to health and well being, family support, and motivation.  Things that may interfere with the patient's success in treatment include: lack of a sober peer support network, physical health concerns, peer network mainly consists of other alcohol and/or drug users, and lacks awareness regarding the risks and potential negative consequences of substance abuse.     Assessments:  The following assessments were completed by patient for this visit:  PHQ9:       4/15/2025    12:00 PM   PHQ-9 SCORE   PHQ-9 Total Score 3     GAD2:       4/15/2025    12:00 PM   ISABELLA-2   Feeling nervous, anxious, or on edge 1   Not being able to stop or control worrying 1   ISABELLA-2 Total Score 2     PROMIS 10-Global Health (all questions and answers displayed):       4/15/2025    12:00 PM   PROMIS 10   In general, would you say your health is: 3   In general, would you say your quality of life is: 3   In general, how would you rate your physical health? 3   In general, how would you rate your mental health, including your mood and your ability to think? 3   In general, how would you rate your  satisfaction with your social activities and relationships? 3   In general, please rate how well you carry out your usual social activities and roles. (This includes activities at home, at work and in your community, and responsibilities as a parent, child, spouse, employee, friend, etc.) 3   To what extent are you able to carry out your everyday physical activities such as walking, climbing stairs, carrying groceries, or moving a chair? 3   In the past 7 days, how often have you been bothered by emotional problems such as feeling anxious, depressed, or irritable? 3   In the past 7 days, how would you rate your fatigue on average? 3   In the past 7 days, how would you rate your pain on average, where 0 means no pain, and 10 means worst imaginable pain? 0   Global Mental Health Score 12   Global Physical Health Score 14   PROMIS TOTAL - SUBSCORES 26     [place] Suicide Severity Rating Scale (Lifetime/Recent)      4/15/2025    12:00 PM   [place] Suicide Severity Rating (Lifetime/Recent)   1. Wish to be Dead (Lifetime) N   2. Non-Specific Active Suicidal Thoughts (Lifetime) N   Actual Attempt (Lifetime) N   Has subject engaged in non-suicidal self-injurious behavior? (Lifetime) N   Interrupted Attempts (Lifetime) N   Aborted or Self-Interrupted Attempt (Lifetime) N   Preparatory Acts or Behavior (Lifetime) N   Calculated C-SSRS Risk Score (Lifetime/Recent) No Risk Indicated     GAIN-sliding scale:      4/15/2025    12:00 PM   When was the last time that you had significant problems...   with feeling very trapped, lonely, sad, blue, depressed or hopeless about the future? Never   with sleep trouble, such as bad dreams, sleeping restlessly, or falling asleep during the day? Past Month   with feeling very anxious, nervous, tense, scared, panicked or like something bad was going to happen? Past month   with becoming very distressed & upset when something reminded you of the past? Past month   with thinking about ending  your life or committing suicide? Never          4/15/2025    12:00 PM   When was the last time that you did the following things 2 or more times?   Lied or conned to get things you wanted or to avoid having to do something? Never   Had a hard time paying attention at school, work or home? Never   Had a hard time listening to instructions at school, work or home? Never   Were a bully or threatened other people? Never   Started physical fights with other people? Never     Personal and Family Medical History:  The patient did report a family history of mental health concerns.  The patient reported family history includes Anxiety Disorder in his mother, sister, sister, sister, sister, and sister; Depression in his mother, sister, sister, sister, sister, and sister; Substance Abuse in his brother, father, mother, sister, sister, and sister.     The patient reported the following previous mental health diagnoses: The patient denied having any history of being diagnosed with a clinical mental health disorder.   The patient reported his primary mental health symptoms include: anxiety, sleep problems, and symptoms related to past traumatic life events and these do not impact his ability to function.  The patient has not received mental health services in the past: The patient denied having any history of being prescribed psychotropic medications.  The patient denied having any history of working with a 1:1 mental health therapist.  Psychiatric Hospitalizations: None.  The patient denies a history of civil commitment.  Current mental health services/providers include:  The patient denied having any history of being prescribed psychotropic medications.  The patient denied having any history of working with a 1:1 mental health therapist.    The patient has had a physical exam to rule out medical causes for current symptoms.  Date of last physical exam was within the past year. The patient was encouraged to follow up with PCP if  symptoms were to develop.  The patient has a [place] Primary Care Provider, who is named Deborah Chacon.  The patient reported the following medical concerns:   Past Medical History:   Diagnosis Date    Elevated LFTs     Pre-diabetes     Type 2 diabetes, HbA1c goal < 7% (H)    The patient denied taking any medications at this time, but he would benefit from having a medical appointment to be evaluated for the need for medications at this time.  The patient denied having any current issues with pain.  The patient denied being pregnant.  There are not significant appetite / nutritional concerns / weight changes.  The patient does not report having a history of an eating disorder.  The patient denied having any history of a head injury, head trauma or cognitive impairment.     The patient denied taking any OTC or prescription medications at this time.    Medication Adherence:  The patient denied being prescribed any medications at this time.  The patient reported being able to self-administer his medications.    Patient Allergies:    No Known Allergies    Medical History:    Past Medical History:   Diagnosis Date    Elevated LFTs     Pre-diabetes     Type 2 diabetes, HbA1c goal < 7% (H)       Substance Use:  The patient reported the following biological family members or relatives with chemical health issues: family history includes Substance Abuse in his brother, father, mother, sister, sister, and sister.  The patient denied having any history of participating in a substance use disorder treatment program.  The patient denied having any inpatient detoxification admissions or inpatient hospitalizations for withdrawal symptoms.  The patient is not currently receiving any substance use disorder treatment services.  The patient denied having any history of attending 12-step or other recovery support group meetings.      Substance Age of first use Pattern and duration of use (include amounts and frequency) Date of  last use     Withdrawal potential Route of use   Has used Alcohol 16 The patient reported his heaviest use of alcohol had been during the past 3 years, when he reported a pattern of drinking between 6-8 beers and a quart of whiskey around 4 times per week on average.    The patient reported his longest period of time without drinking alcohol had been for 3-months in 2020.  The patient reported his relapse with alcohol had been due to having cravings to drink alcohol and due to drinking alcohol to self-medicate feeling very anxious after his father had been kidnapped and had disappeared around 3 years ago.     The patient reported having memory impairment due to his use of alcohol around 1 time per month on average during the past 12-months.   4/4/2025  No Oral   Has not used Marijuana          Has not used Amphetamines          Has not used Cocaine/crack           Has not used Hallucinogens        Has not used Inhalants        Has not used Heroin        Has not used Other Opiates        Has not used Benzodiazepines          Has not used Barbiturates        Has not used Over the counter medications        Has used Nicotine 11 The patient reported a current pattern of smoking 5 cigarettes on a daily basis.    4/15/2025  Yes  Smoked    Has use Caffeine 16 The patient reported a current pattern of drinking 1 cup of coffee on a daily basis.  4/15/2025  No  Oral   Has not used other substances not listed above:  Identify:           The patient reported the following problems as a result of their substance use: relationship problems, family problems, legal issues, and DUI.  The patient is concerned about substance use.  The patient reported her recovery goal is: The patient's plan and goal is to abstain from alcohol and from all other non-prescribed mood altering chemicals.     The patient reports experiencing the following withdrawal symptoms within the past 12 months: sweating, shaky/jittery/tremors, unable to sleep,  agitation, headache, fatigue, sad/depressed feeling, muscle aches, irritability, sensitivity to noise, dizziness, diarrhea, diminished appetite, and anxiety/worry and the following within the past 30 days: sweating, shaky/jittery/tremors, unable to sleep, agitation, headache, fatigue, sad/depressed feeling, muscle aches, irritability, sensitivity to noise, dizziness, diarrhea, diminished appetite, and anxiety/worry. (DSM-11)  The patient reported having urges to use alcohol and nicotine.  (DSM-4)  The patient reported he has used more alcohol than intended and over a longer period of time than intended.  (DSM-1)  The patient reported he has had unsuccessful attempts to cut down or control his use of alcohol and nicotine.  (DSM-2)  The patient reported his longest period of time without drinking alcohol had been for 3-months in 2020.  The patient reported his relapse with alcohol had been due to having cravings to drink alcohol and due to drinking alcohol to self-medicate feeling very anxious after his father had been kidnapped and had disappeared around 3 years ago.   The patient reported he has needed to use more alcohol and nicotine to achieve the same effect.  (DSM-10)  The patient does report diminished effect with his use of same amount of alcohol and nicotine.  (DSM-10)     The patient does not report a great deal of time is spent in activities necessary to obtain, use, or recover from alcohol effects.  (DSM-3)  The patient does report important social, occupational, or recreational activities are given up or reduced because of alcohol use.  (DSM-7)  The patient denied having any knowledge of having a persistent or recurrent physical or psychological problem that is likely to have been caused or exacerbated by use.   (DSM-9)  The patient reported the following problem behaviors while under the influence of substances: The patient reported having relationship conflict with his wife, being more impulsive, being  more socially isolated, and having some memory impairment when under the influence of alcohol.  (DSM-6)  The patient reported recurrent use of alcohol in physically hazardous situations such as driving a motor vehicle while under the influence.  (DSM-8)    The patient reported his substance use has not negatively impacted his ability to function in a school setting within the past 12-months.  (DSM-5)  The patient reported his substance use has not negatively impacted his ability to function in a work setting within the past 12-months.  (DSM-5)  The patient's demographics and history impact his recovery in the following ways: family history includes Anxiety Disorder in his mother, sister, sister, sister, sister, and sister; Depression in his mother, sister, sister, sister, sister, and sister; Substance Abuse in his brother, father, mother, sister, sister, and sister.  The patient reported engaging in the following recreation/leisure activities while using alcohol or other non-prescribed mood altering chemicals: The patient's use of alcohol had been done independently of his social/recreational/leisure activities.  The patient reported the following people are supportive of his recovery: his wife and his mother-in-law.     The patient denied having current or past concerns regarding gambling and he denied ever participating in a gambling treatment program.  The patient does not have other addictive behaviors he is concerned about at this time.     Dimension Scale Ratings:    Dimension 1 -  Acute Intoxication/Withdrawal: 0 - No Problem  Summary to support rating:  Withdrawal Management - No Withdrawal Management Indicated.    Dimension 2 - Biomedical: 1 - Minor Problem  Summary to support rating:  The patient has had a physical exam to rule out medical causes for current symptoms.  Date of last physical exam was within the past year. The patient was encouraged to follow up with PCP if symptoms were to develop.  The  patient has a [facility] Primary Care Provider, who is named Deborah Chacon.  The patient reported the following medical concerns:   Past Medical History:   Diagnosis Date    Elevated LFTs     Pre-diabetes     Type 2 diabetes, HbA1c goal < 7% (H)    The patient denied taking any medications at this time, but he would benefit from having a medical appointment to be evaluated for the need for medications at this time.  The patient denied having any current issues with pain.  The patient denied being pregnant.  There are not significant appetite / nutritional concerns / weight changes.  The patient does not report having a history of an eating disorder.  The patient denied having any history of a head injury, head trauma or cognitive impairment.       Dimension 3 - Emotional/Behavioral/Cognitive Conditions: 1 - Minor Problem  Summary to support rating:  The patient denied having any history of being diagnosed with a clinical mental health disorder.   The patient reported his primary mental health symptoms include: anxiety, sleep problems, and symptoms related to past traumatic life events and these do not impact his ability to function.  The patient has not received mental health services in the past: The patient denied having any history of being prescribed psychotropic medications.  The patient denied having any history of working with a 1:1 mental health therapist.  Psychiatric Hospitalizations: None.  The patient denies a history of civil commitment.  Current mental health services/providers include:  The patient denied having any history of being prescribed psychotropic medications.  The patient denied having any history of working with a 1:1 mental health therapist.    Dimension 4 - Readiness to Change:  2 - Moderate Problem  Summary to support rating:  The patient displayed verbal compliance to abstain from alcohol and from all other non-prescribed mood altering chemicals, but he had lacked consistent  behavior to support abstinence, including failing to seek out prior substance abuse treatment despite having a significant history of having negative consequences due to his substance abuse issues.  The patient did appear to be willing to enter an Intensive Outpatient program at one of the [facility] for substance use disorder treatment.    Dimension 5 - Relapse/Continued Use/ Continued Problem Potential: 3 - Severe Problem  Summary to support rating:  The patient had continued to abuse alcohol despite having negative consequences, including having medical problems, relationship problems, and legal problems which were exacerbated by her substance abuse.  The patient has been unable to maintain abstinence from alcohol while living at his current home environment.  The patient appears to lack sober coping skills and he lacks long-term sober maintenance skills.  The patient has medical problems which are exacerbated by his substance abuse. The patient lacks awareness regarding the disease model of addiction.  The patient lacks a current sober peer support network.    Dimension 6 - Recovery Environment:  2 - Moderate Problem  Summary to support rating:  The patient reported living with his wife and their 3 children and he reported his housing is stable.  The patient denied having any concerns regarding his immediate living environment and/or neighborhood, but he had been unable to maintain abstinence from alcohol while living there.  The patient identified his wife and his mother-in-law as being his primary support network at this time.  The patient identified the quality of his relationships with his support network as being good overall, but somewhat strained due to the patient's substance abuse.  The patient would like the following people involved in treatment services if recommended: his wife.  The patient reported engaging in the following recreational/leisure activities: playing basketball.  The  patient reported engaging in the following recreation/leisure activities while using alcohol or other non-prescribed mood altering chemicals: The patient's use of alcohol had been done independently of his social/recreational/leisure activities.  The patient reported the following people are supportive of his recovery: his wife and his mother-in-law.  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  The patient reported his income is obtained from his employment.  The patient denied having any financial stressors at this time.  Cultural and socioeconomic factors do not affect the patient's access to services.  The patient reported the following substance related arrests or legal issues: The patient reported being arrested for an implied consent DWI in [facility] on 4/4/2025, when the patient had refused to submit to alcohol testing.  The patient reported he had been pulled over by police on 4/4/2025 secondary to being caught speeding.  The patient reported he had consumed around 5 beers over a 2 hour period of time, after he had gotten off of work, prior to driving on 4/4/2025.  The patient reported having 1 prior DWI charge around 7 years ago, after which he reported attending and completing a DWI class.  The patient's current legal charge is pending and the patient had not yet been started on court probation in [facility].    Significant Losses / Trauma / Abuse / Neglect Issues:   The patient did not serve in the .  There are indications or report of significant loss, trauma, abuse or neglect issues related to: The patient denied having any history of being verbally, emotionally, physically, or sexually abused.  The patient denied having a history of being a victim of exploitation.  The patient reported having a history of trauma issues due to his father being kidnapped and going missing around 3 years ago.   Concerns for possible neglect are not  present.    Safety Assessment:   The patient denies current or past homicidal ideation and behaviors.  The patient denied any current or past history of having suicidal ideation and he denied having any history of suicide attempts.  The patient denied having any history of self-injurious behavior.   The patient reported unsafe motor vehicle operation, reported having legal consequences, and reported having memory impairment associated with substance use.  The patient denied any high risk behaviors associated with mental health symptoms.  The patient denied current or past personal safety concerns.    The patient denied a history of assaultive behaviors.    The patient denied having any history of sexual assault behaviors.  The patient denied having any history of being registered as a sex offender.   The patient reported there are not any firearms in the home.    Patient reports the following protective factors: forward/future oriented thinking, dedication to family/friends, living with other people, daily obligations, commitment to well-being, and financial stability.       Risk Plan:  See Recommendations for Safety and Risk Management Plan.    Review of Symptoms per patient report:  Substance Use:  some memory impairment due to substance use, significant withdrawal symptoms, cravings/urges to use, family relationship problems due to substance use, social problems related to substance use, substance related legal problems, and driving under the influence.     Diagnostic Criteria:   1.)  Substance is often taken in larger amounts or over a longer period than was intended.  Met for:  alcohol.  2.)  There is persistent desire or unsuccessful efforts to cut down or control use of the substance.  Met for:  alcohol and nicotine.  4.)  Craving, or a strong desire or urge to use the substance.  Met for:  alcohol and nicotine.  6.)  Continued use of the substance despite having persistent or recurrent social or  interpersonal problems caused or exacerbated by the effects of its use.  Met for:  alcohol.  7.)  Important social, occupational, or recreational activities are given up or reduced because of the substance.  Met for:  alcohol.  8.)  Recurrent use of the substance in which it is physically hazardous.  Met for:  THC/cannabis.  10.)  Tolerance:  either a need for markedly increased amounts of the substance to achieve the desired effect or a markedly diminished effect with continued use of the same amount of the substance.  Met for:  alcohol and nicotine.  11.)  Withdrawal:  either patient endorses characteristic withdrawal syndrome for the substance or the substance (or closely related substance) is taken to relieve or avoid withdrawal symptoms.  Met for:  alcohol and nicotine.    Collateral Contact Summary:   Collateral contacts contributing to this assessment:  The patient's electronic medical records were reviewed at time of assessment.    No additional collateral data had been obtained at the time of this documentation.     If court related records were reviewed, summarize here: None    Information from collateral contacts supported/largely agreed with information from the client and associated risk ratings.    Information in this assessment was obtained from the medical record and provided by the patient who is a good historian.        The patient will have open access to her substance use disorder assessment medical record.    As evidenced by self report and criteria, the patient meets the following DSM-5 Diagnoses: (Sustained by DSM-5 Criteria Listed Above)      1.)  Alcohol Use Disorder Severe - 303.90 (F10.20)  2.)  Tobacco Use Disorder Moderate - 305.10 (F17.200)    Specify if: In early remission:  After full criteria for alcohol/drug use disorder were previously met, none of the criteria for alcohol/drug use disorder have been met for at least 3 months but for less than 12 months (with the exception that  Criterion A4,  Craving or a strong desire or urge to use alcohol/drug  may be met).     In sustained remission:   After full criteria for alcohol use disorder were previously met, none of the criteria for alcohol/drug use disorder have been met at any time during a period of 12 months or longer (with the exception that Criterion A4,  Craving or strong desire or urge to use alcohol/drug  may be met).     Specify if:   This additional specifier is used if the individual is in an environment where access to alcohol is restricted.    Mild: Presence of 2-3 symptoms  Moderate: Presence of 4-5 symptoms  Severe: Presence of 6 or more symptoms    Functional Status:  The patient reported the following functional impairments:  The patient denied having any significant functional impairments at this time.       AMISH/DUAL Programmatic Care:    1. Does the patient have a history of vulnerability such as being teased, picked on, or other indications of potential safety issues with other residents?  No    2. Does this patient have a history of being the victim of abuse? The patient denied having any history of being verbally, emotionally, physically, or sexually abused.    3. Does this patient have a history of victimizing others? No     4. Does the patient have a history of boundary violations?  No.    5. Does the patient have a history of other sexual acting out behaviors (e.g grooming)?   No    6. Does the patient have a history of threats to self or others? Fire setting, running away or other self-injurious behaviors?    No    7. Does the patient s history indicate the need for special precautions or particular staffing patterns in the facility?  No    NOTE: If this screening indicates that the patient is at risk to harm self or others, notify staff at referral location.    Recommendations:     1. Plan for Safety and Risk Management:     Recommended that patient call 911 or go to the local ED should there be a change in any of  these risk factors.            Report to child / adult protection services was not needed.    2. AMISH Referrals:       Recommendations:      1.)  It was recommended the patient abstain from alcohol and from all other non-prescribed mood altering chemicals.   2.)  Follow all of the recommendations of his healthcare providers.  3.)  The patient may benefit from having an outpatient mental health assessment to rule out having a current clinical mental health disorder.    4.)  Follow all of the terms and conditions of his pending court probation, including participating in random alcohol and drug screening with court probation as directed.   5.)  Enter an Intensive Outpatient program at one of the [facility] for substance use disorder treatment.  6.)  Follow all of the recommendations of his substance use disorder treatment providers.  7.)  Attend 12-step or other recovery support group meetings on a weekly basis.        The patient reported he was willing to follow the above recommendations.   The patient's primary language is Burkinan and he will need a Burkinan  for all of his treatment services.     The patient meets criteria for the following levels of care based on ASAM Criteria:      Withdrawal Management - No Withdrawal Management Indicated.    Treatment/Recovery Services - 2.1 Intensive Outpatient Services.      The patient's placement aligns with the assessment and placement level of care recommendation based on current ASAM Dimension scale ratings.    The patient would like the following family or other support people involved in their treatment:  his wife.  The patient does not have any history of opioid abuse.      3.  Mental Health Referrals:     The patient may benefit from having an outpatient mental health assessment to rule out having a current clinical mental health disorder.      4. Clinical Substantiation for the above recommendations: The patient would benefit from  developing long-term sober maintenance skills, would benefit from developing sober coping skills, would benefit from developing a sober peer support network, has medical issues which are exacerbated by substance abuse, and lacks awareness regarding the disease model of addiction.    5.  Cultural Concerns:    The patient identified a language barrier: The patient's primary language is Marshallese and he will need a Marshallese  for all of his treatment services.     6. Recommendations for treatment focus:      Alcohol / Substance Use - See #2. AMISH Referrals above for details on recommendations.    7. Interactive Complexity: No    8. Safety Plan:  Patient denied any current/recent/lifetime history of suicidal ideation and/or behaviors.  No safety plan indicated at this time.      Provider Name/ Credentials:  LETY Toribio  April 15, 2025

## 2025-04-21 ENCOUNTER — TELEPHONE (OUTPATIENT)
Dept: BEHAVIORAL HEALTH | Facility: CLINIC | Age: 40
End: 2025-04-21

## 2025-04-21 NOTE — TELEPHONE ENCOUNTER
4/21/2025    Spoke with pt wife about options outside of [facility]. Pt and wife wondering if there are programs on Saturdays. Writer will do research and get back to them on Wednesday. Pt's wife in agreement with plan.

## 2025-04-24 NOTE — TELEPHONE ENCOUNTER
4/24/2025    Left VM for pt's wife. Unable to find any programs that accommodate Saturday programming. Let wife know pt will remain on wait list for [facility]. If they have any further questions, left my number.

## 2025-05-05 ENCOUNTER — TELEPHONE (OUTPATIENT)
Dept: BEHAVIORAL HEALTH | Facility: CLINIC | Age: 40
End: 2025-05-05
Payer: COMMERCIAL

## 2025-05-05 NOTE — TELEPHONE ENCOUNTER
----- Message from Kylie Lin sent at 5/2/2025  2:30 PM CDT -----  Regarding: SERVICE INITIATION  SERVICE INITIATION SCHEDULED ON 5/6, FRANKIE

## 2025-05-06 ENCOUNTER — HOSPITAL ENCOUNTER (OUTPATIENT)
Dept: BEHAVIORAL HEALTH | Facility: CLINIC | Age: 40
Discharge: HOME | End: 2025-05-06
Attending: FAMILY MEDICINE
Payer: COMMERCIAL

## 2025-05-06 ENCOUNTER — VIRTUAL VISIT (OUTPATIENT)
Dept: BEHAVIORAL HEALTH | Facility: CLINIC | Age: 40
End: 2025-05-06
Payer: COMMERCIAL

## 2025-05-06 DIAGNOSIS — F10.20 ALCOHOL USE DISORDER, SEVERE, DEPENDENCE (H): Primary | ICD-10-CM

## 2025-05-06 PROCEDURE — H2035 A/D TX PROGRAM, PER HOUR: HCPCS

## 2025-05-06 PROCEDURE — 98002 SYNCH AUDIO-VIDEO NEW MOD 45: CPT | Performed by: FAMILY MEDICINE

## 2025-05-06 ASSESSMENT — PATIENT HEALTH QUESTIONNAIRE - PHQ9
10. IF YOU CHECKED OFF ANY PROBLEMS, HOW DIFFICULT HAVE THESE PROBLEMS MADE IT FOR YOU TO DO YOUR WORK, TAKE CARE OF THINGS AT HOME, OR GET ALONG WITH OTHER PEOPLE: NOT DIFFICULT AT ALL
SUM OF ALL RESPONSES TO PHQ QUESTIONS 1-9: 5
SUM OF ALL RESPONSES TO PHQ QUESTIONS 1-9: 5

## 2025-05-06 NOTE — PROGRESS NOTES
Individual Session Summary   START TIME: 1:00PM   END TIME: 3:00PM   Duration: 2 Hours    Hal Landa is a 39 year old male who is being evaluated via in person visit. His wife was also present for this appointment as well as interpretor via telephone.     Substance Use  Diagnosis:  TBD    Mental Health Diagnosis:  TBD    Data:  Met with client on this date for his Service Initiation. Client completed necessary mental health evaluations, signed necessary releases of information, updated progress/concerns since original assessment was completed on 4/15/25 gained insight into the rules and expectations of the program, and treatment schedule. He was given necessary information relating to STD's HIV, TB, Opioid risks, grievance policy, bill of rights, and info on obtaining health care directives. Client was given a goals list and safety plan to complete prior to next individual session scheduled on 5/12/25 @ 12pm. We discussed early warning signs, healthy coping skills, as well as explained some addiction terms such PAWS, triggers/cravings, Early warning signs, coping skills.     Intervention: Q and A, mental health evaluations, necessary intake paperwork/information, support system, PAWS, MI, stages of change, stages of relapse, coping strategies, combating distorted thoughts, and building therapeutic relationship.      Assessment: Client appeared motivated to follow rules and expectations of the program. Client was open and shared appropriate feedback     Plan. Client will complete goals list by next individual session. He will start group on 5/7/25 5/6/2025     3:00 PM   Suicide Ideation Check In   Since last session, how often have you had suicidal thoughts? No thoughts of suicide       Attestation: Haven Wise MD- Provides oversight and supervision of care.    Lisette Handy [facility]

## 2025-05-06 NOTE — PROGRESS NOTES
"Comprehensive Assessment Update: 5/6/25    Comprehensive assessment dated 4/15/25 was reviewed and updates are as follows: Client does meet criteria for IOP 2.1 level of care however his insurance only approve funding for Level 1.0 OP. Due to funding restrictions, he is recommended to complete level 1.0 outpatient treatment. If he is unsuccessful at OP level of care, he will be recommended to higher level of care.     Reason for admission today:  \"I need to quit for this bad life. The most important thing is to quit drinking so I can enjoy more of my family and to be more healthy\".    Dates of last use and substance(s) used:  4/4/25  Patient does not have a history of opiate use.    Vulnerability Assessment:  Does the patient have a history of vulnerability such as being teased, picked on, or other indications of potential safety issues with other residents?  No    Does this patient have a history of being the victim of abuse? No history of abuse reported or documented.    Does this patient have a history of victimizing others? No     Does the patient have a history of boundary violations?  No.    Does the patient have a history of other sexual acting out behaviors (e.g grooming)?   No    Does the patient have a history of threats to self or others? Fire setting, running away or other self-injurious behaviors?    No    Does the patient s history indicate the need for special precautions or particular staffing patterns in the facility?  No      NOTE: If this screening indicates that the patient is likely to have sexually abusive behavior, the license schneider must have written risk   management plans to protect the patient, other patients, staff, and the community.    Other safety concerns:  None identified       Other:  NA    Health Screening:  Given patient's past history, a medication, and physical condition, is there a fall risk?  No  Does the patient have any pain? No  Is the patient on a special diet? If yes, " "please explain: Low salf  Does the patient have any concerns regarding your nutritional status? If yes, please explain: no  Has the patient had any appetite changes in the last 3 months? Yes, eats more since hasn't been drinking  Has the patient had any weight loss or weight gain in the last 3 months? No  Has the patient have a history of an eating disorder or been over-eating, avoiding meals, or inducing vomiting?  No  Does the patient have any dental concerns? (Problems with teeth, pain, cavities, braces)?  NO  Are immunizations up to date?  Yes  Any recent exposure to TB, Hepatitis, Measles, or Strep?  No    Brief Biosocial Gambling Screening:  Do you have any current or past concerns regarding gambling?  No      Dimension Scale Ratings:    Dimension 1: 0 Client displays full functioning with good ability to tolerate and cope with withdrawal discomfort. No signs or symptoms of intoxication or withdrawal or resolving signs or symptoms.    Summary to support rating:  Client reports problematic use of alcohol acknowledging history of daily consumption with LDU being 4/4/25. Client shares experiencing acute withdrawal symptoms expressing behaviors of drinking alcohol in the morning, to \"Feel better\". He reports experiencing current post acute withdrawal symptoms such as lack of motivation/interests. Client met with addiction medicine on 5/6/25 and client has a follow up appointment to further discuss options for medications to address alcohol cravings. He denies history of detox. No signs of intoxication were observed. Client submitted to [facility]. Results were negative for any/all substances.     Dimension 2: 1 Client displays full functioning with good ability to cope with physical discomfort.  Summary to support rating:  Client has history of the following biomedical conditions/diagnosis: Elevated liver enzymes, Type 2 diabetes mellitus (without complication, without long-term current use of insulin\",and mild " "cholesterol elevation. He has a [facility] PCP by the name of Alexys Nieto MD and has a [facility] prescriber for medication management, ANNE Greene CNP. Client appears able to access medical services as needed. He denies any current biomedical concerns.     Dimension 3: 2 Client lacks impulse control and coping skills. Client displays moderate to severe symptoms of emotional, behavioral or cognitive problems.  Client's mental health symptoms make it difficult for him to function adequately in significant life areas.  Summary to support rating: Client denies any history of mental health diagnosis however acknowledged experiencing the following mental health symptoms: Lack of interest to do things, Feeling down/depressed/or hopeless, sleep difficulties, fatigue, feeling back about himself, and difficulty controlling worrying. He completed mental health questionnaires, receiving the following scores: PHQ9- 2, GAD7- 13, Satvdd13- 26, and C-SSRS- No risk identified. Client lacks healthy strategies to cope with mental health symptoms and lacks impulse control. Client has no previous treatments for mental health services. He lacks insight into mental health and the phenomenon of co-occurring disorders. Client reports history of past trauma due to his father being kidnapped 3 years ago and still missing. Client states, \"I think they killed him\". He endorses coping with situation by increasing his alcohol use. Client would benefit from obtaining a diagnostic assessment to rule out mental health diagnosis. Client denies any history or current thoughts of harm to self or others.     Dimension 4: 2 Client displays verbal compliance, but lacks consistent behaviors; has low motivation for change; and is passively involved in treatment.  Summary to support rating:  Client reports motivation for treatment being:  \"I need to quit for this bad life. The most important thing is to quit drinking so I can enjoy more of my " "family and to be more healthy\". He endorses external motivators such as: courts, wife, and family. He is able to identify alcohol use having negatively impacted his relationships, employment, recreational activities, physical health, and legal involvement. Client appeared open with his responses and expressed willingness to comply with rules and requirements of the program however lacks consistent behaviors to support abstinence.     Dimension 5: 3 Client has poor recognition and understanding of relapse and recidivism issues and displays moderately high vulnerability for further substance use or mental health problems. Client has few coping skills and rarely applies coping skills.  Summary to support rating:  Client reports completing a DWI class in 2019. He denies any other CD treatments. Client reports longest period of sobriety was 3 months, in 2020 sharing return to use was due to triggers/cravings and coping with his Dad's disappearance. Client endorses current period of sobriety is due to having to submit to 3 breathalyzers/day as per court monitoring. He endorses biggest trigger for use being \"Habits\" as he would consistently drink as soon as he was home from work. Client endorses concern and curiosity in how to cope with this trigger. Client remains at high risk for relapse for reasons including but not limited to history of continued use despite negative consequences, history of daily use, lacking insight into addiction/mental health triggers/symptoms, lack of healthy strategies to cope with mental health/chemical health symptoms, lack of sober support system and/or sober activities, and ongoing participation in risky behaviors such as DWI.     Dimension 6: 2 Client is engaged in structured, meaningful activity, but peers, family, significant other, and living environment are unsupportive, or there is criminal justice involvement by the client or among the client's peers, significant others, or in the " client's living environment.  Summary to support rating: Client is a 39 year old, [place], male who's primary language is Burkinan. He resides in own home with his wife of 23 years and their 3 minor children. Client reports having to work 2-full-time jobs to support his family. Client endorses wife and kids continue to express their concern for his drinking adding his drinking is negatively impacting his family and their peace of mind. Client reports history of two DWI's, one of which he has upcoming court dates for. He also verbalizes tendency to drive after he has been drinking, which is the main concern for his family. Client lacks sober peer support as majority of his friends drink. He does participate in playing basketball with friends 1x/wk however endorses drinking was/is involved. Client acknowledges struggling to ask for help and asserting his thoughts and feelings to others, especially his wife.     Primary Diagnoses:  1.)  Alcohol Use Disorder Severe - 303.90 (F10.20)  Secondary Diagnoses 2.)  Tobacco Use Disorder Moderate - 305.10 (F17.200)    Initial Service Plan (ISP)    Immediate health, safety, and preliminary service needs identified and plan includes the following based on available information from clients, referral sources, and collateral information.    Safety (SI, SIB, suicide attempts, aggressive behaviors):  History of SI (suicidal ideation)?  Denies    History of SIB (self-injurious behavior)?  Denies  History of VI (violent ideation)?  Denies  History of HI (homicidal ideation)?  Denies      Recommended that patient call 911 or go to the local ED should there be a change in any of these risk factors       Name: Hal Landa  YOB: 1985  Date: 5/6/2025  My primary care provider: Alexys Nieto MD  My primary care clinic: [facility]  My prescriber: ANNE Salguero CNP   Other care team support:  Hanh Evans RD for diabetes education   My  Triggers (check the ones that apply to you):   Relationship Conflict-  Work Difficulties-   Home Environment-   Peer Relationships-   Financial Concerns-          Additional People, Places, and Things that I can access for support:     [place].   [place].      What is important to me and makes life worth living: My children and my family          GREEN    Good Control  1. I feel good  2. No suicidal thoughts   3. Can work, sleep and play      Action Steps  1. Self-care: balanced meals, exercising, sleep practices, etc.  2. Take your medications as prescribed.  3. Continue meetings with therapist and prescriber.  4.  Do the healthy things that I enjoy.  5. Other:                  YELLOW  Getting Worse  I have ANY of these:  1. I do not feel good  2. Difficulty Concentrating  3. Sleep is changing  4. Increase/Change in my thoughts to hurt self and/or others, but I can still manage and not act on it.   5. Not taking care of self.  6. Other:                 Action Steps (in addition to the above):  1. Inform your therapist and psychiatric prescriber/PCP.  2. Keep taking your medications as prescribed.    3. Turn to people you can ask for help.  4. Use internal coping strategies -see below.  5. Create safe environment:        -Store firearms for safety       -Lock and limit medications       -Notify friends/family of increase in symptoms  6. Other:                 RED  Get Help  If I have ANY of these:  1. Current and uncontrollable thoughts and/or behaviors to hurt self and/or others.   2. Other:    Actions to manage my safety  1.Contact your emergency person:   -Name:Lynne Mehta  -Number: [phone]  2. Call or Text [phone]  3. Call my crisis team-         -County: [place]        -Number:[phone]  3. Or Call 911 or go to the emergency room right away  4.  Other:                My Internal Coping Strategies include the following ([place] ones you use):  Take a shower  Belly breathing  Time with pets  Temperature  change    Safety Concerns  How To Identify Situations That Make Your Mental Health Worse:  Triggers are things that make your mental health worse.  Look at the list below to help you find your triggers and what you can do about them.     1. Identify Early Warning Signs:    Sometimes symptoms return, even when people do their best to stay well. Symptoms can develop over a short period of time with little or no warning, but most of the time they emerge gradually over several weeks.  Early warning signs are changes that people experience when a relapse is starting. Some early warning signs are common and others are not as common.   Common Early Warning Signs:   Feeling tense or nervous  Eating less or more  Trouble sleeping  Feeling depressed or low  Feeling irritable  Isolating  Trouble concentrating        2. Identify action steps to take when warning signs are noticed:    Taking Action- It is important to take action if you are experiencing early warning signs of a relapse.  The faster you act, the more likely it is that you can avoid a full relapse.  It is helpful to identify several specific ways to cope with symptoms.      The following is my list of symptoms and coping strategies that I can use when they are present:    Symptom Coping Strategies   Anxiety -Talk with someone in your support system and let him or her know how you are feeling.  -Use relaxation techniques such as deep breathing or imagery.  -Use positive affirmations to counteract negative self-talk such as  I am learning to let go of worry.    Depression - Schedule your day; include activities you must do and activities you enjoy doing.  - Get some exercise - walk, run, bike, or swim.  - Give yourself credit for even the smallest things you get done.   Sleep Difficulties   - Go to sleep at the same time every day.  - Do something relaxing before bed, such as drinking herbal tea or listening to music.  - Avoid having discussions about upsetting  topics before going to bed.   Delusions   - Distract yourself from the disturbing thought by doing something that requires your attention such as a puzzle.  - Check out your beliefs by talking to someone you trust.    Hallucinations   - Use headphones to listen to music.  - Tell voices to  stop  or say to yourself,  I am safe.   - Ignore the hallucinations as much as possible; focus on other things.   Concentration Difficulties - Minimize distractions so there is only one thing for you to focus on at a time.    - Ask the person you are having a conversation with to slow down or repeat things you are unsure of.      Patient consented to co-developed safety plan.  Safety and risk management plan was completed.  Patient agreed to use safety plan should any safety concerns arise.  A copy was given to the patient.     Health:  Client does NOT have health issues that would impede participation in treatment    Transportation: Wife or medical rides     Other:  NA    Patient has the following barriers to participating in services:  Language barrier.  These will be addressed by incorporating interpretor in any/all treatment programming..    Vulnerable Adult Assessment    Does the patient possess a physical or mental infirmity or other physical, mental, or emotional dysfunction?  No. Patient is not a vulnerable adult.    This patient is not a functional Vulnerable Adult according to [place] Statute 626.5572 subdivision 21.      Treatment suggestions for client for the time period until the initial treatment planning session: Complete Goals List      FADUMO Horta, [facility]

## 2025-05-07 ASSESSMENT — ANXIETY QUESTIONNAIRES
GAD7 TOTAL SCORE: 13
3. WORRYING TOO MUCH ABOUT DIFFERENT THINGS: MORE THAN HALF THE DAYS
1. FEELING NERVOUS, ANXIOUS, OR ON EDGE: MORE THAN HALF THE DAYS
2. NOT BEING ABLE TO STOP OR CONTROL WORRYING: MORE THAN HALF THE DAYS
6. BECOMING EASILY ANNOYED OR IRRITABLE: NEARLY EVERY DAY
4. TROUBLE RELAXING: MORE THAN HALF THE DAYS
5. BEING SO RESTLESS THAT IT IS HARD TO SIT STILL: SEVERAL DAYS
7. FEELING AFRAID AS IF SOMETHING AWFUL MIGHT HAPPEN: SEVERAL DAYS
GAD7 TOTAL SCORE: 13

## 2025-05-08 NOTE — ADDENDUM NOTE
Encounter addended by: Lisette Handy LADC on: 5/8/2025 3:51 PM   Actions taken: Clinical Note Signed

## 2025-05-14 ENCOUNTER — HOSPITAL ENCOUNTER (OUTPATIENT)
Dept: BEHAVIORAL HEALTH | Facility: CLINIC | Age: 40
Discharge: HOME | End: 2025-05-14
Attending: FAMILY MEDICINE
Payer: COMMERCIAL

## 2025-05-14 PROCEDURE — H2035 A/D TX PROGRAM, PER HOUR: HCPCS | Mod: HQ

## 2025-05-14 NOTE — GROUP NOTE
"Group Therapy Documentation    PATIENT'S NAME: Hal Landa  MRN:   [id number]  :   1985  ACCT. NUMBER: [id number]  DATE OF SERVICE: 25  START TIME:  9:00 AM  END TIME: 12:00 PM  FACILITATOR(S): Lisette Handy LADC  TOPIC: BEH Group Therapy  Number of patients attending the group:  5  Group Length:  3 Hours    Dimensions addressed: 1, 2, 3, 4, 5, and 6    Summary of Group / Topics Discussed:    Interpersonal Effectiveness:  DEAR MAN: Facilitated a group discussion and education focusing on the DEAR MAN. Clients reviewed DBT core concepts: goals, dialectic definition, modules, and mind states. Client's further reviewed communication errors, what gets in the way of effective communication, and the purpose of the interpersonal effectiveness module. Clients reviewed and were taught the DEAR MAN skill, its meaning, and what situations warranted use of these skills. Client then later rehearsed and role played the skill to show their knowledge and learning.      Group Objectives:      Client to identify the core concepts of DBT interpersonal effectiveness module     Client to identify what gets in the way of effective communication     Client to identify the goal of interpersonal effectiveness     Client to define the DEAR MAN skill and when to apply the skills      Client to engage in a role play and rehearse the DEAR MAN skill       Group Attendance:  Attended group session    Patient's response to the group topic/interactions:  cooperative with task, discussed personal experience with topic, expressed understanding of topic, and listened actively    Patient appeared to be Actively participating, Attentive, and Engaged.        Client specific details:  Client participated in mindfulness activity, acknowledging \"I wasn't thinking about anything else\". Client shared weekly check-in questions rating the following on a scale of 0-10 (10 being highest) Depression 4, Anxiety 6, Motivation 8, and " Cravings 2. No use episodes or concerns reported. We reviewed last group topic and introduced MING Skill. Client was able to use ming skills in regards to a personal situation he would like to resolve with his son. Client acknowledged seeing the value in this skill stating he gained insight into the effectiveness of communicated assertively.         5/14/2025     3:00 PM   Suicide Ideation Check In   Since last session, how often have you had suicidal thoughts? No thoughts of suicide       Lisette Handy, FADUMO, LADC

## 2025-05-15 ENCOUNTER — HOSPITAL ENCOUNTER (OUTPATIENT)
Dept: BEHAVIORAL HEALTH | Facility: CLINIC | Age: 40
End: 2025-05-15
Attending: FAMILY MEDICINE
Payer: COMMERCIAL

## 2025-05-15 PROCEDURE — H2035 A/D TX PROGRAM, PER HOUR: HCPCS | Mod: HQ

## 2025-05-15 NOTE — GROUP NOTE
"Group Therapy Documentation    PATIENT'S NAME: Hal Landa  MRN:   [id number]  :   1985  ACCT. NUMBER: [id number]  DATE OF SERVICE: 5/15/25  START TIME:  9:00 AM  END TIME: 12:00 PM  FACILITATOR(S): Lisette Handy LADC  TOPIC: BEH Group Therapy  Number of patients attending the group:  4  Group Length:  3 Hours    Dimensions addressed: 3, 4, 5, and 6    Summary of Group / Topics Discussed:    Interpersonal Effectiveness: FAST Skill: Facilitated a group discussion and education focusing on the FAST skill. Provided a handout that reviewed each of the areas of the DBT FAST skill (be fair, no apologies, stick to values, be truthful). Client's further reviewed communication errors, what gets in the way of effective communication, and the purpose of the interpersonal effectiveness module.     Group Objectives/ Goals  Client to discuss how to intentionally use the FAST skill  Client will identify values that they would like to stick to while using this skill  Client will identify situations where the FAST skill would be helpful    Group Attendance:  Attended group session    Patient's response to the group topic/interactions:  Cooperative with task and discussed personal experience with topic.     Patient appeared to be Actively participating, Attentive, and Engaged.        Client specific details: Clients need and request for in person interpretors were not met, again. Facilitator attempted to provide client with [place] translated check in questions and Values worksheet. Due to no interpretor support, facilitator made sure to take additional time to explain terms and concepts he was unfamiliar with. Client participated in mindfulness activity, acknowledging \"It was really relaxing\". Client shared the following check-in answers with group: two feelings: anxious and willing, grateful for his job and license, and affirmations: I got this.  Client gained insight into DBT interpersonal effectiveness " "skill FAST. We continued to stress the important of \"Sticking to our Values\" in regards to increasing our self respect. Client was given worksheet(s) to help determine top values as well as bottom values. He requested to take the worksheet home so his wife can assist him in completing it.  He will be asked to discuss values during next group sessions.        5/15/2025     3:00 PM   Suicide Ideation Check In   Since last session, how often have you had suicidal thoughts? No thoughts of suicide         Lisette Handy, FADUMO, LADC    "

## 2025-05-19 ENCOUNTER — BEH TREATMENT PLAN (OUTPATIENT)
Dept: BEHAVIORAL HEALTH | Facility: CLINIC | Age: 40
End: 2025-05-19
Payer: COMMERCIAL

## 2025-05-19 ENCOUNTER — TELEPHONE (OUTPATIENT)
Dept: BEHAVIORAL HEALTH | Facility: CLINIC | Age: 40
End: 2025-05-19
Payer: COMMERCIAL

## 2025-05-19 NOTE — TELEPHONE ENCOUNTER
----- Message from Lisette Handy sent at 5/16/2025  3:48 PM CDT -----  Regarding: Scheduling  Please SchedulePatient Name and MRN: Hal Landa [id number] of program: [place]Date: 5/21/25Time: 8:00 AMSUD IOP PHASE 2 MIXED ([id number])  Visit type:  In-PersonAppointment Length: 60 min. Appointment Reason: IndividualBilling Type: part of program Thank You!Lisette Handy

## 2025-05-19 NOTE — TREATMENT PLAN
"    [facility]  MENTAL HEALTH AND ADDICTION    CO-OCCURRING RESIDENTIAL AND IOP TREATMENT PLAN    [facility] LEVEL OF CARE:  1.0 Outpatient Program  Initiated on: May 21, 2025    DIMENSION 1: Intoxication / Withdrawal Potential (Potencial de Intoxicación / Abstinencia)   Initial Risk Ratin  (Calificación de Riesgo Inicial: 0)    Identified areas of concern/focus: History of daily use    Client's Goal (Objetivo del cliente): \"To complete leave alcohol behind\" (\"Para completar, yasmani el alcohol atrás.\")  Clinical Goals ([place] del clínico): Client will abstain from any/all mood altering substances unless prescribed by a licensed physician and gain insight into PAWS. (Calificación de riesgo inicial El cliente se abstendrá de cualquier sustancia que altere el estado de ánimo a menos que sea prescrita por un médico licenciado y obtendrá información sobre el PAWS.)        Date/ Initials Methods/Interventions  (Métodos/Intervenciones) Target Date Extended Date Extended Date Stopped Completed Initials   May 21, 2025 I will hold my self accountable by communicating with staff and peers if were to have a use episode   Me haré responsable comunicándome con el personal y compañeros si tuviera un episodio de uso. 8/5/25      May 21, 2025 I will increase my accountability for sobriety by submitting to random UA's or breathalyzer's as requested by staff.   aumentará mi responsabilidad por la sobriedad al someterme a pruebas de detección de sustancias o pruebas de aliento aleatorias según lo solicite el personal. 8/5/25      May 21, 2025 I will understand the short and long term effects of withdrawal symptoms by attending PAWS group topic session and identify both acute and post acute symptoms I have or am currently experiencing  Entenderé los efectos a corto y mary plazo de los síntomas de abstinencia asistiendo a la sesión temática del timmy PAWS e identificaré tanto los síntomas agudos tian los " "post-agudos que tengo o que estoy experimentando actualmente. 25        DIMENSION 2: Biomedical Conditions/Complications - (Condiciones/Complicaciones Biomédicas)   Initial Risk Ratin  (Calificación de Riesgo Inicial: 1)    Identified areas of concern/focus:  Pre-diabetic.    As evidenced by:    Clients medical charts.    Client's Goal (Objetivo del cliente): \"To take care of Diabetes\" (\"Cuidar la Diabetes\")  Clinical Goals ([place] del clínico):  Client will gain insight into the importance of physical health and how physical health impacts our mental and chemical health. (El cliente obtendrá jessica comprensión de la importancia de la casie física y cómo la casie física impacta nuestra casie mental y química.)        Date/ Initials Methods/Interventions  (Métodos/Intervenciones)) Target Date Extended Date Extended Date Stopped Completed Initials   May 21, 2025 I will practice self care by following up with my PCP for any/all biomedical concerns and/or routine visits as and follow any/all recommendations for medications  Practicaré el cuidado personal haciendo un seguimiento con mi médico de atención primaria para cualquier preocupación biomédica y/o visitas de rutina, y seguiré todas las recomendaciones para los medicamentos. 8/3/25      May 21, 2025 I will practice self care by walking my dog for 1/2 mile 4x/wk.    Practicaré el autocuidado paseando a mi christelle breanna 1/2 lópez 4 veces a la semana. 6/23/25      May 21, 2025 I will improve self care habits by eating at least one salad/day  Voy a mejorar mis hábitos de autocuidado comiendo al menos jessica ensalada al día. 7/1/25      May 21, 2025 I will improve sleep habits by getting into bed by 11pm each night.   Mejoraré mis hábitos de sueño y me acostaré a las 11 de la noche cada noche. 25                DIMENSION 3:Emotional/Behavioral Conditions/Complications (Condiciones/Complicaciones Emocionales/Conductuales)   Initial Risk Ratin  (Calificación de " "Riesgo Inicial: 2)    Identified areas of concern/focus:   No history of MH    As evidenced by:    Patient Reports    Client's Goal (Objetivo del cliente): \"To be more open about my feelings to others\" (Ser más abierto sobre mis sentimientos hacia los demás\")  Clinical Goals ([place] del clínico):  Client will gain insight into mental health symptoms and be able to identify and utilize healthy strategies to cope with mental health symptoms (El cliente adquirirá información sobre los síntomas de casie mental y podrá identificar y utilizar estrategias saludables para hacer frente a los síntomas de casie mental.)  Client will strengthen protective factors.  Must be reached in order to have services terminated?  Yes  Target Date: 8/3/25   (El cliente fortalecerá los factores de protección.  Se debe alcanzar para que se terminen los servicios? Sí, fecha objetivo:)         Date/ Initials Methods/Interventions (Métodos/Intervenciones) Target Date Extended Date Extended Date Stopped Completed Initials   May 21, 2025 I gain awareness of mental health symptoms by identifying specific mental health symptoms I am experiencing, each week and share with staff and peers during group sessions.   (Aumentaré mi conciencia sobre los síntomas de casie mental identificando síntomas específicos de casie mental que estoy experimentando cada semana y compartiré con el personal y compañeros breanna las sesiones grupales.) 8/3/25        May 21, 2025 I will practice healthly strategies to cope with emotions by communicating my feeling with my partner 5x's/week, when she picks up from work.  Voy a practicar estrategias saludables para manejar las emociones comunicando mis sentimientos con mi elsie 5 veces a la semana, cuando tiburcio me recoge del trabajo. 25                                  DIMENSION 4: Treatment Acceptance/Resistance - (Aceptación/Resistencia al Tratamiento)   Initial Risk Ratin  (Calificación de Riesgo Inicial: " "2)    Identified areas of concern/focus:    Alcohol Use Disorders;  303.90 (F10.20) Alcohol Use Disorder Severe  Moderate motivation for treatment    As evidenced by:  Meets DSM 5 criteria for substance use disorder.  Court ordered treatment.    Client's Goal (Objetivo del cliente): \"To complete this program\"  Clinical Goals ([place] del clínico):  Client will gain insight into the value of Acceptance and Surrender as well as increasing internal motivation for change. (El cliente obtendrá jessica comprensión del valor de la Aceptación y la Rendición, así tian un aumento en la motivación interna para el cambio.)      Date/ Initials Methods/Interventions  (Métodos/Intervenciones) Target Date Extended Date Extended Date Stopped Completed Initials   May 19, 2025 Client will meet individually with [facility] on a bi-weekly basis to review and update treatment plan goals, progress, and/or concerns  El cliente se reunirá individualmente con [facility] de manera quincenal para revisar y actualizar los objetivos del plan de tratamiento, el progreso y/o las preocupaciones.         May 19, 2025 Client will attend LADC led AIMSH group 2x/wk for a total of 6 hours, during phase II.  El cliente asistirá al timmy [facility] veces por semana breanna un total de 6 horas, breanna la fase II.       May 19, 2025 Client will increase motivation for change by identifying 10 harmful consequences relating to use and share insight with peers and staff during group session.  El cliente aumentará la motivación para el cambio identificando 10 consecuencias perjudiciales relacionadas con el uso y compartiendo ideas con compañeros y personal breanna la sesión grupal.       May 19, 2025            DIMENSION 5: Relapse/Continued Problem Potential - (Recaída/Potencial de Problemas Continuos)   Initial Risk Rating: 3  (Calificación de Riesgo Inicial: 3)  Identified areas of concern/focus:  Lack of knowledge/coping skills related to to relapse triggers and " "coping strategies    As Evidenced by:  Client lacks coping skills for relapse prevention.        Client's Goal (Objetivo del cliente): \"To keep alcohol out of my home\"  Clinical Goals ([place] del clínico: Client will gain insight into personal early warning signs, triggers, and urges as well as develop healthy strategies to reduce risk of relapse/continued use. (El cliente obtendrá información sobre las señales de advertencia personales, los desencadenantes y los impulsos, así tian desarrollará estrategias saludables para reducir el riesgo de recaída o uso continuo).    Client has established and utilizes a relapse prevention plan.  Must be reached in order to have services terminated? (El cliente ha establecido y utiliza un plan de prevención de recaídas.  Debe ser alcanzado para que se terminen los servicios?.)  Must be reached in order to have services terminated? Yes    Target Date: 8/3/25         Date/ Initials Methods/Interventions  (Métodos/Intervenciones) Target Date Extended Date Extended Date Stopped Completed Initials     May 21, 2025 Client will increase awareness of personal triggers and cravings by rating his cravings and verbalizing triggers he has experienced each week during group.   El cliente aumentará la conciencia sobre lila desencadenantes y antojos personales al calificar lila antojos y verbalizar los desencadenantes que fong experimentado cada semana breanna el timmy. 8/3/25      May 21, 2025 Client will increase awareness of personal triggers by completing assignment on  Internal and external triggers, and share insight with peers and staff during group session.  El cliente aumentará la conciencia de lila desencadenantes personales al completar jessica tarea sobre desencadenantes internos y externos, y compartirá lila ideas con compañeros y personal breanna la sesión grupal. 25                DIMENSION 6: Recovery Environment - (Entorno de recuperación)   Initial Risk Ratin  (Calificación de " "Riesgo Inicial: 2)    Identified areas of concern/focus: Lack of sober support (falta de apoyo sobrio)  Chemical use by peer group  Lack of sober / recreational interests  Legal issues    As evidenced by:    Client reports most peer group uses.    Clients lacks sober activities.      Client's Goal (Objetivo del cliente): \"\"  Clinical Goals ([place] del clínico:)    Establish a transition plan connecting to culturally informed services in the community for post-treatment follow up care.  Must be reached in order to have services terminated?  Yes  Target Date:  8/3/25  (Establecer un plan de transición que conecte con servicios culturalmente informados en la comunidad para el seguimiento post-tratamiento.  Debe alcanzarse para que los servicios se terminen? Sí Fecha objetivo: **)        Date/ Initials Methods/Interventions  (Métodos/Intervenciones) Target Date Extended Date Extended Date Stopped Completed Initials   May 21, 2025 Client will increase daily structure and routine by participating in at least 1 sober recreational activity per week.   El cliente aumentará la estructura y la rutina diaria al participar en al menos 1 actividad recreativa sobria por semana. 7/10/25                                          Client Strengths: [place] del Cliente: Client Treatment Plan Adaptations: Adaptaciones del Plan de Tratamiento del Cliente:    Client does not need adjustments at this time.(El cliente no necesita ajustes en jewels momento.)    The following adjustments will be made based on the above identified plan: (Se realizarán los siguientes ajustes según el plan identificado anteriormente:) NA   The following staff have contributed to this plan: Lisette Handy, FADUMO, LADC  (El siguiente personal ha contribuido a jewels plan):        Were family/support people involved in the treatment planning?  No - List reason why not:  No specific reason.   ( Estuvieron involucrados familiares/personas de apoyo en la planificación " del tratamiento? No - Motivo por el cual no: No hay un motivo específico.)    Resources  Resources to which the patient is being referred for problems when they are to be addressed concurrently by another provider: No Referrals Needed    [x] Contact insurance to ensure referrals are in network    Vulnerable Adult Review   [x] Review of the facility Abuse Prevention plan was reviewed with the patient   [x] No individual abuse plan is necessary   [] In addition to the facility Abuse Prevention plan, an Individual Abuse Plan will be put in place      Hal Landa attests their date of last use as 4/4/25. Client attests they have participated in the creation of this treatment plan. Hal Landa has been provided a copy of this treatment plan and is in agreement with how this plan is written and will be stored in the electronic record.   (Hal Landa atestigua vásquez fecha de último uso tian 4/4/25. El cliente atestigua que ha participado en la creación de jewels plan de tratamiento. Se le ha proporcionado a Hal Landa jessica copia de jewels plan de tratamiento y está de acuerdo con la forma en que está redactado jewels plan y se almacenará en el registro electrónico.)    Client Signature:  _______________________________  Date: ____________________    [facility] Signature:  _______________________________  Date: ____________________

## 2025-05-20 NOTE — PROGRESS NOTES
Addiction Outpatient Weekly Clinical Staffing     Hal Landa was staffed on 5/20/2025 . Hal Landa was staffed on recovery strengths, barriers and treatment progress.     Staff present: LETY Vazquez , LETY Horta , Arelis Rios MS, [facility] , Fifi Reyna [facility], [facility] , Fidel Esparza MS, [facility] , Jackie Valenzuela MS, [facility] , and Brianne Vigil [facility]     Date: 5/20/2025 Time: 3:59 PM    Staff Signature: LETY Horta

## 2025-05-21 ENCOUNTER — HOSPITAL ENCOUNTER (OUTPATIENT)
Dept: BEHAVIORAL HEALTH | Facility: CLINIC | Age: 40
Discharge: HOME | End: 2025-05-21
Attending: FAMILY MEDICINE
Payer: COMMERCIAL

## 2025-05-21 PROCEDURE — H2035 A/D TX PROGRAM, PER HOUR: HCPCS | Mod: HQ

## 2025-05-21 PROCEDURE — H2035 A/D TX PROGRAM, PER HOUR: HCPCS

## 2025-05-21 NOTE — GROUP NOTE
Group Therapy Documentation    PATIENT'S NAME: Hal Landa  MRN:   [id number]  :   1985  ACCT. NUMBER: [id number]  DATE OF SERVICE: 25  START TIME:  9:00 AM  END TIME: 12:00 PM  FACILITATOR(S): Lisette Handy LADC  TOPIC: BEH Group Therapy  Number of patients attending the group:  3  Group Length:  3 Hours    Dimensions addressed: 1, 2, 3, 4, 5, and 6    Summary of Group / Topics Discussed:    Identifying Values: Facilitated a group discussion on values and how one s values can be impacted by addiction. Provided education on what are values. Provided a values assessment inventory to have client s rank and identify their top values. Engaged in a discussion where clients shared their top values and how their addiction impacted their values. Facilitate discussion utilizing clients identified values to motivate them to make changes that support their values.??       Group Objectives/Goals:??   Clients will identify their top values?   Clients will become aware of how their use has become more important that their values?   Clients will identify strategies to aid them in supporting their true values.??     Therapeutic outcome(s) measured by:??   Client s Completion of values assessment handout?   Client s feedback relating to their addiction and values?   Client s ability to identify goals or strategies so they can better support their true values.??          Group Attendance:  Attended group session with with the assistance of two-in- person interpreters.     Patient's response to the group topic/interactions:  cooperative with task, discussed personal experience with topic, expressed understanding of topic, gave appropriate feedback to peers, and listened actively    Patient appeared to be Actively participating, Attentive, and Engaged.        Client specific details:  Client participated in mindfulness activity, acknowledging the importance of being mindful to smaller details.  Client  "shared weekly check-in questions rating the following on a scale of 0-10 (10 being highest) Depression 0, Anxiety 1, Motivation 8, and Cravings 0. No use episodes or concerns reported. Client shared insight into his value questionnaire identifying top values being: Family, Shinto, and helping others.  Client was able to identify how continued drinking would negatively impact his values. He shared one SMART goal he can accomplish in the next 24 hours being: \"I will take time during dinner tonight, to express my love and appreciation for my family\".         5/21/2025     2:00 PM   Suicide Ideation Check In   Since last session, how often have you had suicidal thoughts? No thoughts of suicide         Lisette Handy, BS, LADC    "

## 2025-05-21 NOTE — PROGRESS NOTES
Individual Session Summary   START TIME: 8:00 AM  END TIME: 9:00AM   Duration: 1 Hour    Hal Landa is a 39 year old male who is being evaluated via in person visit.      Substance Use  Diagnosis:  Alcohol Use Disorder, Severe    Mental Health Diagnosis:  No history of MH diagnosis    Data:  Met with client and two in-person translators on this date for an individual session focused on developing goals and strategies relating to each of the six dimensions. Client did not bring their assigned goals list assignment. He was able to identify goals as we developed his TP. Due to extra time required for translation,there was not enough time to complete goals in all 6 dimensions. Client was given assignments relating to increasing internal motivation for change and identifying personal triggers and que's.      Intervention: Q and A, 6 dimensions, goal oriented, MI, client centered, acceptance, coping strategies, affirmations, translation services     Assessment: Client actively participated in the session and was open to sharing personal feedback     Plan. Client will begin participating in strategies outlined in his treatment plan and complete assignments in a timely manner.         5/21/2025     2:00 PM   Suicide Ideation Check In   Since last session, how often have you had suicidal thoughts? No thoughts of suicide         Attestation: Haven Wise MD- Provides oversight and supervision of care.    LETY Horta

## 2025-05-21 NOTE — ADDENDUM NOTE
Encounter addended by: Lisette Handy LADC on: 5/21/2025 4:04 PM   Actions taken: Clinical Note Signed

## 2025-05-22 ENCOUNTER — HOSPITAL ENCOUNTER (OUTPATIENT)
Dept: BEHAVIORAL HEALTH | Facility: CLINIC | Age: 40
End: 2025-05-22
Attending: FAMILY MEDICINE
Payer: COMMERCIAL

## 2025-05-22 PROCEDURE — H2035 A/D TX PROGRAM, PER HOUR: HCPCS | Mod: HQ

## 2025-05-22 NOTE — GROUP NOTE
Group Therapy Documentation    PATIENT'S NAME: Hal Landa  MRN:   [id number]  :   1985  ACCT. NUMBER: [id number]  DATE OF SERVICE: 25  START TIME:  9:00 AM  END TIME: 12:00 PM  FACILITATOR(S): Lisette Handy LADC  TOPIC: BEH Group Therapy  Number of patients attending the group:  5  Group Length:  3 Hours    Dimensions addressed: 3, 4, 5, and 6    Summary of Group / Topics Discussed:    Emotion Regulation:  Understanding Emotions: Facilitated a group discussion around the purpose of understanding emotions and connecting that to the DBT module emotion regulation.   Client watched short video explaining the purpose of emotions: to motivate, protect, communicate. The video describes ways in which people tend to use primary emotions to suppress/cover up secondary emotions which often results in others improperly attending to our needs as message was miscommunicated for instance, when someone feels shame or sadness but express anger, people may avoid rather than comfort/support and therefore the shame or sadness is not validated and emotional need goes unmet. Following video, client identified emotions that are difficult to feel and express and ways in which to effective express hard emotions. Client engaged in discussion with group about emotion expression and benefits of properly identifying emotions.     Group Objectives/Goals    Client will understand the purpose of emotions    Client will understand primary and secondary emotions and the impact of emotion expression, both when effective and when ineffective    Client will have opportunity to discuss difficult emotions to feel, express, and respond to with their peers in a group setting to improve group rapport and practice identifying and labeling emotions    Group Attendance:  Attended group session    Patient's response to the group topic/interactions:  cooperative with task, discussed personal experience with topic, expressed  "understanding of topic, and listened actively    Patient appeared to be Actively participating, Attentive, and Engaged.        Client specific details:  Client participated in mindfulness activity, acknowledging it helped to relieve stress sharing this was the first time he has stretched in many years.  Client shared check-in answers: Feelings: \"Normal\" and \"Ok\". He was given another feelings sheet to help identify more specific feelings.  Grateful for: His family and his job. Affirmations: Client struggled to understand this term. Will follow up with client during next individual. No use episodes or concerns reported. Client watched [employer] on \"Emotional First Aid\" agreeing that we are not shown how to treat mental health symptoms, one physical symptoms.  He then participated in group activity relating to sharing feelings and emotions with the group members. We discussed male stigma's in this society and how that has and still is impacting his life. Client was asked to rate his motivation for being more vulnerable with others on a scale of 0-10  (10 being highest). He reported his motivation for change after group being a 6 sharing his motivation before coming to group was a 0.  Client was challenged to be more mindful of his emotions. Progress will be discussed during next group session.         5/22/2025     2:00 PM   Suicide Ideation Check In   Since last session, how often have you had suicidal thoughts? No thoughts of suicide       Lisette Handy, FADUMO, LADC      "

## 2025-05-28 ENCOUNTER — HOSPITAL ENCOUNTER (OUTPATIENT)
Dept: BEHAVIORAL HEALTH | Facility: CLINIC | Age: 40
Discharge: HOME | End: 2025-05-28
Attending: FAMILY MEDICINE
Payer: COMMERCIAL

## 2025-05-28 PROCEDURE — H2035 A/D TX PROGRAM, PER HOUR: HCPCS | Mod: HQ

## 2025-05-28 NOTE — GROUP NOTE
Group Therapy Documentation    PATIENT'S NAME: Hal Landa  MRN:   [id number]  :   1985  ACCT. NUMBER: [id number]  DATE OF SERVICE: 25  START TIME:  9:00 AM  END TIME: 12:00 PM  FACILITATOR(S): Lisette Handy LADC  TOPIC: BEH Group Therapy  Number of patients attending the group:  4  Group Length:  3 Hours    Dimensions addressed: 1, 2, 3, 4, 5, and 6    Summary of Group / Topics Discussed:    Stages of Relapse: Provided a psychoeducation group on the stages of relapse by providing a handout that goes over the stages of relapses. Engaged in a discussion regarding early relapse prevention plan that goes over emotional relapse, mental relapse, physical relapse. Providing an understanding that relapse as an essential and non-linear process. To help clients be aware of the stages of relapse so they are better able to cope with early warning signs. Clients to learn the importance of self-care and other healthy strategies to help reduce the risk of relapse.     Group Objectives/Goals  Client will identify all three stages of relapse    Client will identify at least one example of early warning signs relating to each of the three stages.    Client will identify healthy strategies they can use to cope with early warning signs.     Emotion Regulation: PLEASE: Facilitated a group discussion and education focusing on the emotion regulation skill PLEASE. Provided education and a handout reviewing the PLEASE skill.  Reviewed each of the areas of the DBT PLEASE skill (treat physical illness, eat healthy, avoid mood altering substances, sleep well, exercise). Patients discussed the connection between taking care of themselves with this skill and reducing their vulnerability to distress. Patients identified examples and ways that they can improve in each of these areas.      Group Objectives/Goals    Client will engage in a discussion on how to intentionally engage in the PLEASE skill  Client will  identify areas of growth within the five areas of PLEASE  Client will identify ways to improve these areas of growth    Group Attendance:  Attended group session    Patient's response to the group topic/interactions:  cooperative with task, discussed personal experience with topic, expressed understanding of topic, and listened actively    Patient appeared to be Actively participating, Attentive, and Engaged.        Client specific details:  Client participated in mindfulness activity, acknowledging it was relaxing. Client shared weekly check-in questions rating the following on a scale of 0-10 (10 being highest) Depression 6, Anxiety 5, Motivation 9, and Cravings 6. No use episodes or concerns reported. Client gained insight into Stages of relapse as well as healthy strategies to cope with symptoms during each stage. Client was introduced to playing the tape through, HALT, and DBT's PLEASED Skill. He acknowledged Eating Healthy being something he does well and needing to improve on sleep habits. He verbalized one thing he can take from this session being: The benefits of sharing his thoughts and feelings with others, as a coping strategy.       5/28/2025     3:00 PM   Suicide Ideation Check In   Since last session, how often have you had suicidal thoughts? No thoughts of suicide         Lisette Handy, FADUMO, LADC

## 2025-05-29 ENCOUNTER — HOSPITAL ENCOUNTER (OUTPATIENT)
Dept: BEHAVIORAL HEALTH | Facility: CLINIC | Age: 40
End: 2025-05-29
Attending: FAMILY MEDICINE
Payer: COMMERCIAL

## 2025-05-29 PROCEDURE — H2035 A/D TX PROGRAM, PER HOUR: HCPCS | Mod: HQ

## 2025-05-29 NOTE — GROUP NOTE
Group Therapy Documentation    PATIENT'S NAME: Hal Landa  MRN:   [id number]  :   1985  ACCT. NUMBER: [id number]  DATE OF SERVICE: 25  START TIME:  9:00 AM  END TIME: 12:00 PM  FACILITATOR(S): Lisette Handy LADC  TOPIC: BEH Group Therapy  Number of patients attending the group:  4  Group Length:  3 Hours    Dimensions addressed: 2, 3, 4, 5, and 6    Summary of Group / Topics Discussed:        Group Attendance:  Attended group session along with an in-person     Patient's response to the group topic/interactions:  cooperative with task, discussed personal experience with topic, expressed understanding of topic, and listened actively    Patient appeared to be Actively participating, Attentive, and Engaged.        Client specific details: Client participated in mindfulness activity acknowledging this could be an effective skill for him. Client completed a self care inventory stating the area he scored best on was Professional and the area he scored least on being psychological/emotional. He was then asked to identify 25 small/daily tasks he can complete to increase areas of self care he scored low in. Client was able to identify appropriate goals. Client was then introduced to next group topic: CBT-Distorted thinking patterns. He shared this group session helped him identify the importance of thinking positive.         2025     1:00 PM   Suicide Ideation Check In   Since last session, how often have you had suicidal thoughts? No thoughts of suicide       Lisette Handy, FADUMO, LETY.

## 2025-06-02 ENCOUNTER — TELEPHONE (OUTPATIENT)
Dept: BEHAVIORAL HEALTH | Facility: CLINIC | Age: 40
End: 2025-06-02
Payer: COMMERCIAL

## 2025-06-02 NOTE — PROGRESS NOTES
"[facility] Weekly Treatment Plan Review      Treatment plan review for the following date span:  5/6/25--6/5/25      ATTENDANCE: Client had no absences during this date span      Weekly Treatment Plan Review     Treatment Plan initiated on: 5/19/25 (Client did not start group till 5/14/25 due to insurance issues).     Dimension1: Acute Intoxication/Withdrawal Potential -   Client Goals Addressed Since last Review: \"To complete leave alcohol behind\" (\"Para completar, yasmani el alcohol atrás\") and abstain from any/all mood altering substances unless prescribed by a licensed physician and gain insight into PAWS. (Calificación de riesgo inicial El cliente se abstendrá de cualquier sustancia que altere el estado de ánimo a menos que sea prescrita por un médico licenciado y obtendrá información sobre el PAWS.)  Are Treatment Plan goals/methods effective? Yes  Date of Last Use 4/4/25  Any reports of withdrawal symptoms - No    At Intake: Client reports problematic use of alcohol acknowledging history of daily consumption with LDU being 4/4/25. Client shares experiencing acute withdrawal symptoms expressing behaviors of drinking alcohol in the morning, to \"Feel better\". He reports experiencing current post acute withdrawal symptoms such as lack of motivation/interests. Client met with addiction medicine on 5/6/25 and client has a follow up appointment to further discuss options for medications to address alcohol cravings. He denies history of detox. No signs of intoxication were observed. Client submitted to [facility]. Results were negative for any/all substances.     6/5/25 Update: Client denies any use episodes. No signs or symptoms of intoxication or withdrawal were observed. Client verbalizes complying with court monitoring via breathalyzer 3x/day.     Dimension 2: Biomedical Conditions & Complications -   Client Goals Addressed Since last Review: \"To take care of Diabetes\" (\"Cuidar la Diabetes\") and gain insight into the " "importance of physical health and how physical health impacts our mental and chemical health. (El cliente obtendrá jessica comprensión de la importancia de la casie física y cómo la casie física impacta nuestra casie mental y química.)  Are Treatment Plan goals/methods effective? Yes  Current Medications & Medication Changes:  No current outpatient medications on file.     No current facility-administered medications for this encounter.       At Intake: Client has history of the following biomedical conditions/diagnosis: Elevated liver enzymes, Type 2 diabetes mellitus (without complication, without long-term current use of insulin\",and mild cholesterol elevation. He has a [place] PCP by the name of Alexys Nieto MD and has a [place] prescriber for medication management, ANNE Greene CNP. Client appears able to access medical services as needed. He denies any current biomedical concerns.     6/5/25 Update: Client reported having blood labs completed verbalizing his levels have improved and shows no signs of diabetes. No biomedical issues or concerns reported or observed. Client attended group topic relating to emotion regulation skill PLEASE - Self care in which he completed a self care inventory and identified 25 small daily goals to to improve each area of self care that he scored low in.     Dimension 3: Emotional/Behavioral Conditions & Complications -   Client Goals Addressed Since last Review: \"To be more open about my feelings to others\" (Ser más abierto sobre mis sentimientos hacia los demás\")  Clinical Goals ([place] del clínico):  Client will gain insight into mental health symptoms and be able to identify and utilize healthy strategies to cope with mental health symptoms (El cliente adquirirá información sobre los síntomas de casie mental y podrá identificar y utilizar estrategias saludables para hacer frente a los síntomas de casie mental.)  Are Treatment Plan goals/methods effective? Yes  The " following assessments were completed by patient for this visit:  PHQ9:       4/15/2025    12:00 PM 4/25/2025    10:19 AM 5/6/2025     9:56 AM   PHQ-9 SCORE   PHQ-9 Total Score MyChart  11 (Moderate depression) 5 (Mild depression)   PHQ-9 Total Score 3 11  5        Patient-reported     GAD7:       5/7/2025     2:00 PM   ISABELLA-7 SCORE   Total Score 13     CAGE-AID:        No data to display              PROMIS 10-Global Health (only subscores and total score):       4/15/2025    12:00 PM   PROMIS-10 Scores Only   Global Mental Health Score 12   Global Physical Health Score 14   PROMIS TOTAL - SUBSCORES 26     [place] Suicide Severity Rating Scale (Short Version)       No data to display              GAIN-sliding scale:      4/15/2025    12:00 PM   When was the last time that you had significant problems...   with feeling very trapped, lonely, sad, blue, depressed or hopeless about the future? Never   with sleep trouble, such as bad dreams, sleeping restlessly, or falling asleep during the day? Past Month   with feeling very anxious, nervous, tense, scared, panicked or like something bad was going to happen? Past month   with becoming very distressed & upset when something reminded you of the past? Past month   with thinking about ending your life or committing suicide? Never          4/15/2025    12:00 PM   When was the last time that you did the following things 2 or more times?   Lied or conned to get things you wanted or to avoid having to do something? Never   Had a hard time paying attention at school, work or home? Never   Had a hard time listening to instructions at school, work or home? Never   Were a bully or threatened other people? Never   Started physical fights with other people? Never     Mental health diagnosis No history of MH diagnosis    Date of last SIB:  Denies  Date of  last SI:  Denies  Date of last HI: Denies  Behavioral Targets:  Asserting feeling and thoughts, developing healthy coping  "strategies, increase internal motivation.     At Intake:  Client denies any history of mental health diagnosis however acknowledged experiencing the following mental health symptoms: Lack of interest to do things, Feeling down/depressed/or hopeless, sleep difficulties, fatigue, feeling back about himself, and difficulty controlling worrying. He completed mental health questionnaires, receiving the following scores: PHQ9- 2, GAD7- 13, Blvgfd92- 26, and C-SSRS- No risk identified. Client lacks healthy strategies to cope with mental health symptoms and lacks impulse control. Client has no previous treatments for mental health services. He lacks insight into mental health and the phenomenon of co-occurring disorders. Client reports history of past trauma due to his father being kidnapped 3 years ago and still missing. Client states, \"I think they killed him\". He endorses coping with situation by increasing his alcohol use. Client would benefit from obtaining a diagnostic assessment to rule out mental health diagnosis. Client denies any history or current thoughts of harm to self or others.     6/5/25 Update: Each week of this date span, client has rated the following on a scale of 0-10 (10 being highest), in chronological order: Depression: 4, 6, and 0. Anxiety: 6, 5, and 4. He reported experiencing Irritation, unmotivated, and over thinking. He coped by smoking a cigarette, talking more, walking, and playing basketball. He actively participates in mindfulness activities during each group session and leaning new healthy strategies to improve emotion regulation skills. He denies any thoughts of hurting self or others.     Dimension 4: Treatment Acceptance / Resistance -   Client Goals Addressed Since last Review: \"To complete this program\" and gain insight into the value of Acceptance and Surrender as well as increasing internal motivation for change. (El cliente obtendrá jessica comprensión del valor de la Aceptación y la " "Rendición, así tian un aumento en la motivación interna para el cambio.)     Are Treatment Plan goals/methods effective? Yes  [facility] Diagnosis:  Alcohol Use Disorder   303.90 (F10.20) Severe uncomplicated  Commitment to tx process/Stage of change- Contemplation  [facility] assignments - 10 Harmful Consequences and Internal/external triggers    At Intake: Client reports motivation for treatment being:  \"I need to quit for this bad life. The most important thing is to quit drinking so I can enjoy more of my family and to be more healthy\". He endorses external motivators such as: courts, wife, and family. He is able to identify alcohol use having negatively impacted his relationships, employment, recreational activities, physical health, and legal involvement. Client appeared open with his responses and expressed willingness to comply with rules and requirements of the program however lacks consistent behaviors to support abstinence.     6/5/25 Update: Client and  met with staff for 2 separate individual sessions to develop his treatment plan. Client has attended all treatment sessions, is respectful to peers and staff, actively participates in both group and individual sessions, and has been putting action towards completing treatment plan goals. He does have two assignments that are considered past due. Client verbalizes he has made the decision to live a life of abstaining from alcohol which shows increased internal motivation since admitting to the program. Foe these reasons, clients rating in this dimension was changed to a 2.     Dimension 5: Relapse / Continued Problem Potential -   Client Goals Addressed Since last Review: \"To keep alcohol out of my home\" and gain insight into personal early warning signs, triggers, and urges as well as develop healthy strategies to reduce risk of relapse/continued use. (El cliente obtendrá información sobre las señales de advertencia personales, los desencadenantes y los " "impulsos, así tian desarrollará estrategias saludables para reducir el riesgo de recaída o uso continuo).  Are Treatment Plan goals/methods effective? Yes  UA results - No results found for this or any previous visit (from the past 4 weeks).    At Intake: Client reports completing a DWI class in 2019. He denies any other CD treatments. Client reports longest period of sobriety was 3 months, in 2020 sharing return to use was due to triggers/cravings and coping with his Dad's disappearance. Client endorses current period of sobriety is due to having to submit to 3 breathalyzers/day as per court monitoring. He endorses biggest trigger for use being \"Habits\" as he would consistently drink as soon as he was home from work. Client endorses concern and curiosity in how to cope with this trigger. Client remains at high risk for relapse for reasons including but not limited to history of continued use despite negative consequences, history of daily use, lacking insight into addiction/mental health triggers/symptoms, lack of healthy strategies to cope with mental health/chemical health symptoms, lack of sober support system and/or sober activities, and ongoing participation in risky behaviors such as DWI     6/5/25 Update: Each week of this date span, client has rated the following on a scale of 0-10 (10 being highest), in chronological order: Cravings: 2, 6, and 3. Triggers identified are:  Justifying thoughts to use, playing basketball with friends and attending a Quinceañera and graduation party. He coped by distracting his attention towards helping with the party, thinking of consequences, and finding alternative to drink. He attended [facility] groups gaining insight into the following topics: Stages of Relapse, Cognitive distortions, correcting cognitive distortions, emotion regulation (self care), value inventory, and interpersonal effectiveness skills (MING, GIVE, and FAST). He reports continuing to submit to 3 " breathalizers per day/ per court conditions.     Dimension 6: Recovery Environment -   Client Goals Addressed Since last Review: No goals establish in this dimension thus far. Will identify goals during individual session on 6/4/25.  Are Treatment Plan goals/methods effective? NA  Family Involvement - None at this time.    At Intake: Client is a 39 year old, [place], male who's primary language is Arabic. He resides in own home with his wife of 23 years and their 3 minor children. Client reports having to work 2-full-time jobs to support his family. Client endorses wife and kids continue to express their concern for his drinking adding his drinking is negatively impacting his family and their peace of mind. Client reports history of two DWI's, one of which he has upcoming court dates for. He also verbalizes tendency to drive after he has been drinking, which is the main concern for his family. Client lacks sober peer support as majority of his friends drink. He does participate in playing basketball with friends 1x/wk however endorses drinking was/is involved. Client acknowledges struggling to ask for help and asserting his thoughts and feelings to others, especially his wife.     6/5/25 Update: Client has been making progress relating to improving family relationships by communicating more assertively and increasing quality family time. He is also practicing self care by asserting himself to his boss and making sure to take breaks at work. He appears to be complying with any/all conditions of courts and realizing the need for setting boundaries with family and friends that drink.     Progress made on transition planning goals: See above dimension updates.    Dimension Scale Review     Prior ratings: Dim1 - 0 DIM2 - 1 DIM3 - 2 DIM4 - 2 DIM5 - 3 DIM6 - 2     Current ratings: Dim1 - 0 DIM2 - 0 DIM3 - 2 DIM4 - 1 DIM5 - 3 DIM6 - 2     Justification for Continued Treatment at this Level of Care: Lack of education in  addiction and mental health triggers, early warning signs, and healthy coping strategies. His tendency to avoid his thoughts and feelings,  and his reluctance  to asking others for help. He lacks sober support system and majority of their outside family's drink..     Others involved since last review:   services  Need for peer recovery support referral? No    Discharge Planning:  Target Discharge Date/Timeframe:  8/12/25  Target Phase 3 Start: 6/23/25   Med Mgmt Provider/Appt: Cancelled upcoming appt.   Ind therapy Provider/Appt:  TBD   Family therapy Provider/Appt:  TBD   Other referrals:  TBD        Is patient a vulnerable adult?  No    *Client received copy of changes: Yes  *Client is aware of right to access a treatment plan review: Yes    Resources  Resources to which the patient is being referred for problems when they are to be addressed concurrently by another provider:  services     [x] Contact insurance to ensure referrals are in network    Vulnerable Adult Review   [x] Review of the facility Abuse Prevention plan was reviewed with the patient   [x] No individual abuse plan is necessary   [] In addition to the facility Abuse Prevention plan, an Individual Abuse Plan will be put in place      Lisette Handy, FADUMO, LADC

## 2025-06-02 NOTE — TELEPHONE ENCOUNTER
----- Message from Lisette Handy sent at 6/2/2025  8:26 AM CDT -----  Regarding: Scheduling  Please Schedule    Patient Name and MRN: Hal Landa [id number]  Location of program: [facility]  Date: 6/4/25  Time: 8:00 AM  [facility] IOP PHASE 2 MIXED ([id number])    Visit type:  In-Person  Appointment Length: 60 min.   Appointment Reason: Individual  Billing Type: part of program     Thank You!  Lisette Handy

## 2025-06-04 ENCOUNTER — HOSPITAL ENCOUNTER (OUTPATIENT)
Dept: BEHAVIORAL HEALTH | Facility: CLINIC | Age: 40
Discharge: HOME | End: 2025-06-04
Attending: FAMILY MEDICINE
Payer: COMMERCIAL

## 2025-06-04 PROCEDURE — H2035 A/D TX PROGRAM, PER HOUR: HCPCS | Mod: HQ

## 2025-06-04 PROCEDURE — H2035 A/D TX PROGRAM, PER HOUR: HCPCS

## 2025-06-04 NOTE — GROUP NOTE
Group Therapy Documentation    PATIENT'S NAME: Hal Landa  MRN:   [id number]  :   1985  ACCT. NUMBER: [id number]  DATE OF SERVICE: 25  START TIME:  9:00 AM  END TIME: 12:00 PM  FACILITATOR(S): Lisette Handy LADC  TOPIC: BEH Group Therapy  Number of patients attending the group:  5  Group Length:  3 Hours    Dimensions addressed: 1, 2, 3, 4, 5, and 6    Summary of Group / Topics Discussed:      Cognitive restructuring  Distortions: Client s received an overview of how to identify common cognitive distortions. Patients will explore alternatives to cognitive distortions and practice challenging their negative thought patterns. The goal is to help patients target modify ineffective thought patterns.     Client Session Goals / Objectives:  Familiarized self with ineffective / unhealthy thoughts and how they develop.    Explored impact of ineffective thoughts / distortions on mood and activity  Formulated new neutral/positive alternatives to challenge less helpful ineffective thoughts.  Practiced and plan to apply in daily life    Group Attendance:  Attended group session    Patient's response to the group topic/interactions:  cooperative with task, discussed personal experience with topic, expressed understanding of topic, gave appropriate feedback to peers, and listened actively    Patient appeared to be Actively participating, Attentive, and Engaged.        Client specific details: Client participated in mindfulness activity. Client shared weekly check-in questions rating the following on a scale of 0-10 (10 being highest) Depression 0, Anxiety 4, Motivation 9, and Cravings 3. No use episodes or concerns reported.  He gained insight into common cognitive distortions however struggled with identifying any patterns he could relate to.  He was asked to be mindful of any/all distorted thoughts he has in the past 24 hours. Then share with staff and peers during tomorrows group session.         6/4/2025     2:00 PM   Suicide Ideation Check In   Since last session, how often have you had suicidal thoughts? No thoughts of suicide       Lisette Handy, FADUMO, LADC

## 2025-06-04 NOTE — TREATMENT PLAN
"    [facility]  MENTAL HEALTH AND ADDICTION    CO-OCCURRING RESIDENTIAL AND IOP TREATMENT PLAN    [facility] LEVEL OF CARE:  1.0 Outpatient Program  Initiated on: May 21, 2025  Updated: 35    DIMENSION 1: Intoxication / Withdrawal Potential (Potencial de Intoxicación / Abstinencia)   Initial Risk Ratin  (Calificación de Riesgo Inicial: 0)    Identified areas of concern/focus: History of daily use    Client's Goal (Objetivo del cliente): \"To complete leave alcohol behind\" (\"Para completar, yasmani el alcohol atrás.\")  Clinical Goals ([place] del clínico): Client will abstain from any/all mood altering substances unless prescribed by a licensed physician and gain insight into PAWS. (Calificación de riesgo inicial El cliente se abstendrá de cualquier sustancia que altere el estado de ánimo a menos que sea prescrita por un médico licenciado y obtendrá información sobre el PAWS.)        Date/ Initials Methods/Interventions  (Métodos/Intervenciones) Target Date Extended Date Extended Date Stopped Completed Initials   May 21, 2025 I will hold my self accountable by communicating with staff and peers if were to have a use episode   Me haré responsable comunicándome con el personal y compañeros si tuviera un episodio de uso. 8/5/25      May 21, 2025 I will increase my accountability for sobriety by submitting to random UA's or breathalyzer's as requested by staff.   aumentará mi responsabilidad por la sobriedad al someterme a pruebas de detección de sustancias o pruebas de aliento aleatorias según lo solicite el personal. 8/5/25      May 21, 2025 I will understand the short and long term effects of withdrawal symptoms by attending PAWS group topic session and identify both acute and post acute symptoms I have or am currently experiencing  Entenderé los efectos a corto y mary plazo de los síntomas de abstinencia asistiendo a la sesión temática del timmy PAWS e identificaré tanto los " "síntomas agudos tian los post-agudos que tengo o que estoy experimentando actualmente. 25        DIMENSION 2: Biomedical Conditions/Complications - (Condiciones/Complicaciones Biomédicas)   Initial Risk Ratin  (Calificación de Riesgo Inicial: 1)    Identified areas of concern/focus:  Pre-diabetic.    As evidenced by:    Clients medical charts.    Client's Goal (Objetivo del cliente): \"To take care of Diabetes\" (\"Cuidar la Diabetes\")  Clinical Goals ([place] del clínico):  Client will gain insight into the importance of physical health and how physical health impacts our mental and chemical health. (El cliente obtendrá jessica comprensión de la importancia de la casie física y cómo la casie física impacta nuestra casie mental y química.)        Date/ Initials Methods/Interventions  (Métodos/Intervenciones)) Target Date Extended Date Extended Date Stopped Completed Initials   May 21, 2025 I will practice self care by following up with my PCP for any/all biomedical concerns and/or routine visits as and follow any/all recommendations for medications  Practicaré el cuidado personal haciendo un seguimiento con mi médico de atención primaria para cualquier preocupación biomédica y/o visitas de rutina, y seguiré todas las recomendaciones para los medicamentos. 8/3/25      May 21, 2025 I will practice self care by walking my dog for 1/2 mile 4x/wk.    Practicaré el autocuidado paseando a mi christelle breanna 1/2 lópez 4 veces a la semana. 6/23/25      May 21, 2025 I will improve self care habits by eating at least one salad/ 5x/wk  Voy a mejorar mis hábitos de autocuidado comiendo al menos jessica ensalada al día. 7/1/25      May 21, 2025 I will improve sleep habits by getting into bed by 11pm each night.   Mejoraré mis hábitos de sueño y me acostaré a las 11 de la noche cada noche. 25                DIMENSION 3:Emotional/Behavioral Conditions/Complications (Condiciones/Complicaciones Emocionales/Conductuales)   Initial Risk " "Ratin  (Calificación de Riesgo Inicial: 2)    Identified areas of concern/focus:   No history of MH    As evidenced by:    Patient Reports    Client's Goal (Objetivo del cliente): \"To be more open about my feelings to others\" (Ser más abierto sobre mis sentimientos hacia los demás\")  Clinical Goals ([place] del clínico):  Client will gain insight into mental health symptoms and be able to identify and utilize healthy strategies to cope with mental health symptoms (El cliente adquirirá información sobre los síntomas de casie mental y podrá identificar y utilizar estrategias saludables para hacer frente a los síntomas de casie mental.)  Client will strengthen protective factors.  Must be reached in order to have services terminated?  Yes  Target Date: 8/3/25   (El cliente fortalecerá los factores de protección.  Se debe alcanzar para que se terminen los servicios? Sí, fecha objetivo:)         Date/ Initials Methods/Interventions (Métodos/Intervenciones) Target Date Extended Date Extended Date Stopped Completed Initials   May 21, 2025 I gain awareness of mental health symptoms by identifying specific mental health symptoms I am experiencing, each week and share with staff and peers during group sessions.   (Aumentaré mi conciencia sobre los síntomas de casie mental identificando síntomas específicos de casie mental que estoy experimentando cada semana y compartiré con el personal y compañeros breanna las sesiones grupales.) 8/3/25        May 21, 2025 I will practice healthly strategies to cope with emotions by communicating my feeling with my partner for 10 min at least 5x's/week  Voy a practicar estrategias saludables para manejar las emociones comunicando mis sentimientos con mi elsie 5 veces a la semana, cuando tiburcio me recoge del trabajo. 25                                  DIMENSION 4: Treatment Acceptance/Resistance - (Aceptación/Resistencia al Tratamiento)   Initial Risk Ratin  (Calificación de Riesgo " "Inicial: 2)    Identified areas of concern/focus:    Alcohol Use Disorders;  303.90 (F10.20) Alcohol Use Disorder Severe  Moderate motivation for treatment    As evidenced by:  Meets DSM 5 criteria for substance use disorder.  Court ordered treatment.    Client's Goal (Objetivo del cliente): \"To complete this program\"  Clinical Goals ([place] del clínico):  Client will gain insight into the value of Acceptance and Surrender as well as increasing internal motivation for change. (El cliente obtendrá jessica comprensión del valor de la Aceptación y la Rendición, así tian un aumento en la motivación interna para el cambio.)      Date/ Initials Methods/Interventions  (Métodos/Intervenciones) Target Date Extended Date Extended Date Stopped Completed Initials   May 19, 2025 Client will meet individually with [facility] on a bi-weekly basis to review and update treatment plan goals, progress, and/or concerns  El cliente se reunirá individualmente con [facility] de manera quincenal para revisar y actualizar los objetivos del plan de tratamiento, el progreso y/o las preocupaciones.         May 19, 2025 Client will attend LADC led AMISH group 2x/wk for a total of 6 hours, during phase II.  El cliente asistirá al timmy [facility] veces por semana breanna un total de 6 horas, breanna la fase II.       May 19, 2025 Client will increase motivation for change by identifying 10 harmful consequences relating to use and share insight with peers and staff during group session.  El cliente aumentará la motivación para el cambio identificando 10 consecuencias perjudiciales relacionadas con el uso y compartiendo ideas con compañeros y personal breanna la sesión grupal.       May 19, 2025            DIMENSION 5: Relapse/Continued Problem Potential - (Recaída/Potencial de Problemas Continuos)   Initial Risk Rating: 3  (Calificación de Riesgo Inicial: 3)  Identified areas of concern/focus:  Lack of knowledge/coping skills related to to relapse " "triggers and coping strategies    As Evidenced by:  Client lacks coping skills for relapse prevention.        Client's Goal (Objetivo del cliente): \"To keep alcohol out of my home\"  Clinical Goals ([place] del clínico: Client will gain insight into personal early warning signs, triggers, and urges as well as develop healthy strategies to reduce risk of relapse/continued use. (El cliente obtendrá información sobre las señales de advertencia personales, los desencadenantes y los impulsos, así tian desarrollará estrategias saludables para reducir el riesgo de recaída o uso continuo).    Client has established and utilizes a relapse prevention plan.  Must be reached in order to have services terminated? (El cliente ha establecido y utiliza un plan de prevención de recaídas.  Debe ser alcanzado para que se terminen los servicios?.)  Must be reached in order to have services terminated? Yes    Target Date: 8/3/25         Date/ Initials Methods/Interventions  (Métodos/Intervenciones) Target Date Extended Date Extended Date Stopped Completed Initials     May 21, 2025 Client will increase awareness of personal triggers and cravings by rating his cravings and verbalizing triggers he has experienced each week during group.   El cliente aumentará la conciencia sobre lila desencadenantes y antojos personales al calificar lila antojos y verbalizar los desencadenantes que fong experimentado cada semana breanna el timmy. 8/3/25      May 21, 2025 Client will increase awareness of personal triggers by completing assignment on  Internal and external triggers, and share insight with peers and staff during group session.  El cliente aumentará la conciencia de lila desencadenantes personales al completar jessica tarea sobre desencadenantes internos y externos, y compartirá lila ideas con compañeros y personal breanna la sesión grupal. 6/4/25 7/1/25 6/4/25 I will reduce risk of relapse by asserting boundaries with people in my life that I used to " "drink with.   Reduciré el riesgo de recaída estableciendo límites con las personas en mi curtis con las que solía beber. 25 I will develop healthier after work habits by coming home and practice self care and quality time with children.   Desarrollará hábitos más saludables después del trabajo al llegar a casa y practicar el cuidado personal y el tiempo de calidad con los niños. 25        DIMENSION 6: Recovery Environment - (Entorno de recuperación)   Initial Risk Ratin  (Calificación de Riesgo Inicial: 2)    Identified areas of concern/focus: Lack of sober support (falta de apoyo sobrio)  Chemical use by peer group  Lack of sober / recreational interests  Legal issues    As evidenced by:    Client reports most peer group uses.    Clients lacks sober activities.      Client's Goal (Objetivo del cliente): \"I will increase time with family and spend more time at home.\" (\"Aumentaré el tiempo con la therese y pasaré más tiempo en casa\".)  Clinical Goals ([place] del clínico:): To develop healthier habits and improve relationships with his family. (Desarrollar hábitos más saludables y mejorar las relaciones con vásquez therese.)    Establish a transition plan connecting to culturally informed services in the community for post-treatment follow up care.  Must be reached in order to have services terminated?  Yes  Target Date:  8/3/25  (Establecer un plan de transición que conecte con servicios culturalmente informados en la comunidad para el seguimiento post-tratamiento.  Debe alcanzarse para que los servicios se terminen? Sí Fecha objetivo: **)        Date/ Initials Methods/Interventions  (Métodos/Intervenciones) Target Date Extended Date Extended Date Stopped Completed Initials   May 21, 2025 Client will increase daily structure and routine by participating in at least 1 sober recreational activity per week.   El cliente aumentará la estructura y la rutina diaria al participar en al menos 1 actividad " recreativa sobria por semana. 7/10/25        6/4/25 I will attend Orthodox with my family 1x/wk.   (Asistiré a la keaton con mi therese jessica vez por semana.) 8/1/25 6/4/25 I will spend quality time with my son at least 1x/wk.   (Pasaré tiempo de calidad con mi hijo al menos 1 vez por semana.) 8/15/25      6/4/25 I will be open and honest with my mom regarding alcohol use concerns by video chatting   (Seré abierto y honesto con mi mamá respecto a las preocupaciones sobre el consumo de alcohol mediante jessica videollamada.)   7/4/25 6/4/25 I will practice better self habits at work by taking at least one 15 min. Break each work shift.  (Practicaré mejores hábitos personales en el trabajo tomando al menos un descanso de 15 minutos en cada turno.)  7/4/25        Client Strengths: Fortalezas del Cliente: Client Treatment Plan Adaptations: Adaptaciones del Plan de Tratamiento del Cliente:    Client does not need adjustments at this time.(El cliente no necesita ajustes en jewels momento.)    The following adjustments will be made based on the above identified plan: (Se realizarán los siguientes ajustes según el plan identificado anteriormente:) NA   The following staff have contributed to this plan: FADUMO Horta, LAD  (El siguiente personal ha contribuido a jewels plan):        Were family/support people involved in the treatment planning?  No - List reason why not:  No specific reason.   ( Estuvieron involucrados familiares/personas de apoyo en la planificación del tratamiento? No - Motivo por el cual no: No hay un motivo específico.)    Resources  Resources to which the patient is being referred for problems when they are to be addressed concurrently by another provider: No Referrals Needed    [x] Contact insurance to ensure referrals are in network    Vulnerable Adult Review   [x] Review of the facility Abuse Prevention plan was reviewed with the patient   [x] No individual abuse plan is necessary   [] In  addition to the facility Abuse Prevention plan, an Individual Abuse Plan will be put in place      Hal Landa attests their date of last use as 4/4/25. Client attests they have participated in the creation of this treatment plan. Hal Dom Landa has been provided a copy of this treatment plan and is in agreement with how this plan is written and will be stored in the electronic record.   (Hal Landa atestigua vásquez fecha de último uso tian 4/4/25. El cliente atestigua que ha participado en la creación de jewels plan de tratamiento. Se le ha proporcionado a Hal Landa jessica copia de jewels plan de tratamiento y está de acuerdo con la forma en que está redactado jewels plan y se almacenará en el registro electrónico.)    Client Signature:  _______________________________  Date: ____________________    [facility] Signature:  _______________________________  Date: ____________________

## 2025-06-05 ENCOUNTER — TELEPHONE (OUTPATIENT)
Dept: INTERNAL MEDICINE | Facility: CLINIC | Age: 40
End: 2025-06-05
Payer: COMMERCIAL

## 2025-06-05 ENCOUNTER — HOSPITAL ENCOUNTER (OUTPATIENT)
Dept: BEHAVIORAL HEALTH | Facility: CLINIC | Age: 40
End: 2025-06-05
Attending: FAMILY MEDICINE
Payer: COMMERCIAL

## 2025-06-05 PROCEDURE — H2035 A/D TX PROGRAM, PER HOUR: HCPCS | Mod: HQ

## 2025-06-05 NOTE — GROUP NOTE
Group Therapy Documentation    PATIENT'S NAME: Hal Landa  MRN:   [id number]  :   1985  ACCT. NUMBER: [id number]  DATE OF SERVICE: 25  START TIME:  9:00 AM  END TIME: 12:00 PM  FACILITATOR(S): Lisette Handy LADC  TOPIC: BEH Group Therapy  Number of patients attending the group:  5  Group Length:  3 Hours    Dimensions addressed: 3, 4, and 5    Summary of Group / Topics Discussed:    Cognitive restructuring:  Client was provided handout on common cognitive distortions including: filtering, polarized thinking, overgeneralization, jumping to conclusions, catastrophizing, personalization, control fallacies, fallacy of fairness, blaming, should, emotional reasoning, fallacy of change, global labeling, always being right, heavens reward fallacy. Client participated in discussion about how cognitive distortions are ways our mind convinces us of something that isn't really true. Cognitive distortions usually reinforce negative thinking and emotions by telling ourselves thing that sound rational and accurate but only serve to keep us feeling bad about ourselves. By learning to correctly identify this kind of thinking, a person can then answer the negative thinking and refute, and overtime will slowly diminish irrational thought and replace with balanced thinking. Client completed worksheet identifying cognitive distortions most commonly experienced with specific examples and ways to start refuting this thinking pattern.     Group Objectives:  Client will understand cognitive distortions and be able to identify cognitive distortions they most use     Client will gain insight regarding the impact cognitive distortions have on one's ability to accurately perceive their world and function     Client will learn ways to adjust these cognitive distortions and with practice can change these negative perceptions over time     Client will follow guided worksheet to practice exploring these cognitions  "further to improve self-awareness    Group Attendance:  Attended group session     Patient's response to the group topic/interactions:  cooperative with task, discussed personal experience with topic, expressed understanding of topic, gave appropriate feedback to peers, and listened actively    Patient appeared to be Actively participating, Attentive, and Engaged.        Client specific details: Client had 1 in-person interpretor attend group session. Client participated in mindfulness activity, acknowledging \"Very relaxed\" adding the guided meditations help him to stay in the moment. He shared the following check-in questions: Two feelings: Satisfied and relived. Grateful for: this group and his family. Affirmations: I believe in myself and my body is strong. He denied any use episodes and or concerns he would like to discuss. We reviewed elaborated on last group topic. Client then gained insight into the 3 C's to correcting distorted thinking and participated in group discussion relating to the outcomes if we continue these unhealthy thought patterns such as: Risk for relapse, lower self esteem, increased and prolonged MH symptoms, and negatively impacting relationship with those around you. Client was given a thought journal worksheet, completing 1st entry in group to assure understanding of assignment. He identified using minimizing things that are bothering his wife. He was able to identify an alternative and more realistic thought about the situation. Client was asked to complete the worksheet by next group.        6/5/2025     1:00 PM   Suicide Ideation Check In   Since last session, how often have you had suicidal thoughts? No thoughts of suicide         Lisette Handy    "

## 2025-06-05 NOTE — ADDENDUM NOTE
Encounter addended by: Lisette Handy LADC on: 6/5/2025 3:50 PM   Actions taken: Pend clinical note, Clinical Note Signed

## 2025-06-11 ENCOUNTER — HOSPITAL ENCOUNTER (OUTPATIENT)
Dept: BEHAVIORAL HEALTH | Facility: CLINIC | Age: 40
Discharge: HOME | End: 2025-06-11
Attending: FAMILY MEDICINE
Payer: COMMERCIAL

## 2025-06-11 PROCEDURE — H2035 A/D TX PROGRAM, PER HOUR: HCPCS | Mod: HQ

## 2025-06-11 NOTE — GROUP NOTE
Group Therapy Documentation    PATIENT'S NAME: Hal Landa  MRN:   [id number]  :   1985  ACCT. NUMBER: [id number]  DATE OF SERVICE: 25  START TIME:  9:00 AM  END TIME: 12:00 PM  FACILITATOR(S): Lisette Handy LADC  TOPIC: BEH Group Therapy  Number of patients attending the group:  3  Group Length:  3 Hours    Dimensions addressed: 1, 2, 3, 4, 5, and 6    Summary of Group / Topics Discussed:    Post-Acute Withdrawal Syndrome (PAWS): ?Clients were provided psychoeducation group regarding Post-Acute Withdrawal Syndrome (PAWS). Provided a video and handouts on PAWS. Providing clients general overview of how quitting substances can continue to affect our physical and mental health, long after substances have left our body and discuss healthy skills to cope with PAWS.?Clients gained an understanding on the signs and symptoms of PAWS, how to utilize healthy strategies to cope with PAWS. Helping client s gain an understand that the recovery process takes time. ?     ??   Group Objectives/Goals     Client will identify at least 2 symptoms of PAW they have experienced and or are currently experiencing?   Client will identify at least 2 strategies they can use to help cope with PAWS?     ??     Group Attendance:  Attended group session    Patient's response to the group topic/interactions:  cooperative with task, discussed personal experience with topic, expressed understanding of topic, and listened actively    Patient appeared to be Actively participating, Attentive, and Engaged.        Client specific details:  Client participated in mindfulness activity, acknowledging having more positive thoughts. Client shared weekly check-in questions rating the following on a scale of 0-10 (10 being highest) Depression 3, Anxiety 5, Motivation 10, and Cravings 3-4. No use episodes or concerns reported. He gained insight into PAWS stating experiencing the following symptoms: worrying, anxiety, and  "irritability. He identifying coping skills being: Focusing on the positive, breathing. One thing he took from this group session was: \"Staying motivated and not lying to myself\".         6/11/2025    11:00 AM   Suicide Ideation Check In   Since last session, how often have you had suicidal thoughts? No thoughts of suicide       LETY Horta    "

## 2025-06-12 ENCOUNTER — HOSPITAL ENCOUNTER (OUTPATIENT)
Dept: BEHAVIORAL HEALTH | Facility: CLINIC | Age: 40
End: 2025-06-12
Attending: FAMILY MEDICINE
Payer: COMMERCIAL

## 2025-06-12 PROCEDURE — H2035 A/D TX PROGRAM, PER HOUR: HCPCS | Mod: HQ

## 2025-06-12 NOTE — GROUP NOTE
Group Therapy Documentation    PATIENT'S NAME: Hal Landa  MRN:   [id number]  :   1985  ACCT. NUMBER: [id number]  DATE OF SERVICE: 25  START TIME:  9:00 AM  END TIME: 12:00 PM  FACILITATOR(S): Lisette Handy LADC  TOPIC: BEH Group Therapy  Number of patients attending the group:  3  Group Length:  3 Hours    Dimensions addressed: 3, 4, 5, and 6    Summary of Group / Topics Discussed:    Cross Addiction: Provided a psychoeducation group on addiction and cross addiction. Educated clients on the DSM-V Criteria for substance use disorder. Provided a power point and a video clip that goes over general education on addiction and the behavior of addiction. Provided a group discussion on the behavior of addiction and understanding the concept of trading one substance/behavior for another. Clients to gain insight into the importance of balancing all areas of life     Group Objectives/Goals  Client will identify how many of the 11 criteria's they meet for AMISH    Client will identify which level of severity they meet for AMISH   Client will gain insight into cross addiction and the importance of balancing all aspects of their lives.    Client will identify at least one behavior that has/is/or could become problematic     Group Attendance:  Attended group session    Patient's response to the group topic/interactions:  cooperative with task, discussed personal experience with topic, expressed understanding of topic, gave appropriate feedback to peers, and listened actively    Patient appeared to be Actively participating, Attentive, and Engaged.        Client specific details:  Client shared the following check-in answers: Two feelings: Inspired and motivated. Grateful for: Paying off my car loan and having a clean house. Affirmations: I believe in myself and My challenges help me grow. He denies any use episodes, thoughts of SI, or having any concerns to discuss. Client shared his completed assignment  "on 10 Harmful consequences. He then gained insight into criteria of addiction acknowledging meeting all 11 criteria. We discussed cross addiction and criteria for other behavioral addictions and how similar criteria's are to those of substance use. Client stating taking the following from the group session: \"leaving alcohol and not falling into other things.\"        6/12/2025     1:00 PM   Suicide Ideation Check In   Since last session, how often have you had suicidal thoughts? No thoughts of suicide       Lisette Handy, BS, LADC    "

## 2025-06-17 NOTE — PROGRESS NOTES
Addiction Outpatient Weekly Clinical Staffing     Hal Landa was staffed on 6/17/2025 . Hal Landa was staffed on recovery strengths, barriers and treatment progress.     Staff present: SILVERIO Horta , Arelis Rios MS, [facility] , Lauren Woods [facility], [facility] , Fifi Reyna [facility], [facility] , Fidel Esparza MS, [facility] , Jackie Valenzuela [facility], [facility] , and Brianne Vigli, [facility]     Date: 6/17/2025 Time: 3:05 PM    Staff Signature: LETY Horta

## 2025-06-18 ENCOUNTER — HOSPITAL ENCOUNTER (OUTPATIENT)
Dept: BEHAVIORAL HEALTH | Facility: CLINIC | Age: 40
Discharge: HOME | End: 2025-06-18
Attending: FAMILY MEDICINE
Payer: COMMERCIAL

## 2025-06-18 ENCOUNTER — BEH TREATMENT PLAN (OUTPATIENT)
Dept: BEHAVIORAL HEALTH | Facility: CLINIC | Age: 40
End: 2025-06-18
Payer: COMMERCIAL

## 2025-06-18 PROCEDURE — H2035 A/D TX PROGRAM, PER HOUR: HCPCS

## 2025-06-18 NOTE — PROGRESS NOTES
Individual Session Summary     THIS SESSION IS NON-BILLABLE DUE TO CLIENT ALREADY BEING BILLED FOR AN INDIVIDUAL THIS DAY    Client [facility] of group session due to low group census.         START TIME: 9:00 AM   END TIME: 10:30 AM   Duration: 1.5 Hours             Topic 6:  readiness to change (stages of change)  This topic will give a general overview of stage of change models and how they apply to the personal experience of each patient.  Objective(s):  Patient will identify at least 2 stage of change models.  Patient will identify at least 5 stages within the Prochaska-DiClemente model.  Patient will identify their own position within the model(s).  Structure:  Provide psychoeducation on readiness to change and stages of change.  Facilitate group discussion around each patient's reasons to change and current place in the model(s).  Use teach-back techniques to ensure patients' understanding.  Provide patients with handouts to enhance learning.  Expected therapeutic outcomes:   Understand change as an essential and non-linear process.  Reduce confusion about ambivalence.  Enhanced motivation to change.  Therapeutic outcome(s) measured by:  Patient's ability to teach-back information learned in group.  Patient's demonstration of learning by identification of their own stage of change.     Client gained insight into stages of change via lecture/slide presentation, watched an educational video with misael LEAL, and was given example scenarios to assure understanding of the topic. Client was able to teach back information as well as relate his alcohol and nicotine use to stages of change. He endorses being in the action stage of change for alcohol as he is making life changes to promote his recovery and in preporation stage of change for nicotine cessation as he has made a plan to cut down his use. Client endorses understanding the concepts of the stages of  "change and verbalized \"This was good\".         6/18/2025    11:00 AM   Suicide Ideation Check In   Since last session, how often have you had suicidal thoughts? No thoughts of suicide        Lisette Handy, FADUMO, LADC    "

## 2025-06-18 NOTE — PROGRESS NOTES
"[facility]  MENTAL HEALTH AND ADDICTION     CO-OCCURRING RESIDENTIAL AND IOP TREATMENT PLAN     [facility] LEVEL OF CARE:  1.0 Outpatient Program  Initiated on: May 21, 2025  Updated: 2025     DIMENSION 1: Intoxication / Withdrawal Potential (Potencial de Intoxicación / Abstinencia)   Current Risk Ratin    Initial Risk Ratin  (Calificación de Riesgo Inicial: 0)     Identified areas of concern/focus: History of daily use     Client's Goal (Objetivo del cliente): \"To completely leave alcohol behind\" (\"Para completar, yasmani el alcohol atrás.\")  Clinical Goals ([place] del clínico): Client will abstain from any/all mood altering substances unless prescribed by a licensed physician and gain insight into PAWS. (Calificación de riesgo inicial El cliente se abstendrá de cualquier sustancia que altere el estado de ánimo a menos que sea prescrita por un médico licenciado y obtendrá información sobre el PAWS.)           Date/ Initials Methods/Interventions  (Métodos/Intervenciones) Target Date Extended Date Extended Date Stopped Completed Initials   May 21, 2025 I will hold my self accountable by communicating with staff and peers if were to have a use episode   Me haré responsable comunicándome con el personal y compañeros si tuviera un episodio de uso. 8/5/25         May 21, 2025 I will increase my accountability for sobriety by submitting to random UA's or breathalyzer's as requested by staff.   aumentará mi responsabilidad por la sobriedad al someterme a pruebas de detección de sustancias o pruebas de aliento aleatorias según lo solicite el personal. 8/5/25         May 21, 2025 I will understand the short and long term effects of withdrawal symptoms by attending PAWS group topic session and identify both acute and post acute symptoms I have or am currently experiencing  Entenderé los efectos a corto y mary plazo de los síntomas de abstinencia asistiendo a la sesión temática del " "timmy PAWS e identificaré tanto los síntomas agudos tian los post-agudos que tengo o que estoy experimentando actualmente. 25      Completed MV      DIMENSION 2: Biomedical Conditions/Complications - (Condiciones/Complicaciones Biomédicas)   Current Risk Ratin    Initial Risk Ratin  (Calificación de Riesgo Inicial: 1)     Identified areas of concern/focus:  Pre-diabetic.     As evidenced by:    Clients medical charts.     Client's Goal (Objetivo del cliente): \"To take care of Diabetes\" (\"Cuidar la Diabetes\")  Clinical Goals ([place] del clínico):  Client will gain insight into the importance of physical health and how physical health impacts our mental and chemical health. (El cliente obtendrá jessica comprensión de la importancia de la casie física y cómo la casie física impacta nuestra casie mental y química.)           Date/ Initials Methods/Interventions  (Métodos/Intervenciones)) Target Date Extended Date Extended Date Stopped Completed Initials   May 21, 2025 I will practice self care by following up with my PCP for any/all biomedical concerns and/or routine visits as and follow any/all recommendations for medications  Practicaré el cuidado personal haciendo un seguimiento con mi médico de atención primaria para cualquier preocupación biomédica y/o visitas de rutina, y seguiré todas las recomendaciones para los medicamentos. 8/3/25         May 21, 2025 I will practice self care by walking my dog for 1/2 mile 4x/wk.    Practicaré el autocuidado paseando a mi christelle breanna 1/2 lópez 4 veces a la semana. 25      Completed MV   May 21, 2025 I will improve self care habits by eating at least one salad/ 5x/wk  Voy a mejorar mis hábitos de autocuidado comiendo al menos jessica ensalada al día. 7/1/25         May 21, 2025 I will improve sleep habits by getting into bed by 11pm each night.   Mejoraré mis hábitos de sueño y me acostaré a las 11 de la noche cada noche. 25  I will improve my " "physical health by running at least 30 min 2x/wk. And share progress with staff during individual session. (Mejoraré mi casie física corriendo al menos 30 minutos 2 veces por semana. Y compartiré el progreso con el personal breanna las sesiones individuales).  25 I will cut down sugary drinks to 1 glass/wk. (Voy a reducir las bebidas azucaradas a 1 vaso por semana.) 25 I will cut down nicotine use by limiting myself to no more than 3 cigarettes/wk. (Reduciré el consumo de nicotina limitándome a no más de 3 cigarrillos por semana.) 25         DIMENSION 3:Emotional/Behavioral Conditions/Complications (Condiciones/Complicaciones Emocionales/Conductuales)   Current Ratin    Initial Risk Ratin  (Calificación de Riesgo Inicial: 2)       Identified areas of concern/focus:   No history of MH     As evidenced by:    Patient Reports     Client's Goal (Objetivo del cliente): \"To be more open about my feelings to others\" (Ser más abierto sobre mis sentimientos hacia los demás\")  Clinical Goals ([place] del clínico):  Client will gain insight into mental health symptoms and be able to identify and utilize healthy strategies to cope with mental health symptoms (El cliente adquirirá información sobre los síntomas de casie mental y podrá identificar y utilizar estrategias saludables para hacer frente a los síntomas de casie mental.)  Client will strengthen protective factors.  Must be reached in order to have services terminated?  Yes  Target Date: 8/3/25   (El cliente fortalecerá los factores de protección.  Se debe alcanzar para que se terminen los servicios? Sí, fecha objetivo:)           Date/ Initials Methods/Interventions (Métodos/Intervenciones) Target Date Extended Date Extended Date Stopped Completed Initials   May 21, 2025 I gain awareness of mental health symptoms by identifying specific mental health symptoms I am experiencing, each week and share with staff and peers " "during group sessions.   (Aumentaré mi conciencia sobre los síntomas de casie mental identificando síntomas específicos de casie mental que estoy experimentando cada semana y compartiré con el personal y compañeros breanna las sesiones grupales.) 8/3/25            May 21, 2025 I will practice healthly strategies to cope with emotions by communicating my feeling with my partner for 10 min at least 5x's/week  Voy a practicar estrategias saludables para manejar las emociones comunicando mis sentimientos con mi elsie 5 veces a la semana, cuando tiburcio me recoge del trabajo. 25                                                         DIMENSION 4: Treatment Acceptance/Resistance - (Aceptación/Resistencia al Tratamiento)   Current Risk Ratin    Initial Risk Ratin  (Calificación de Riesgo Inicial: 2)      Identified areas of concern/focus:    Alcohol Use Disorders;  303.90 (F10.20) Alcohol Use Disorder Severe  Moderate motivation for treatment     As evidenced by:  Meets DSM 5 criteria for substance use disorder.  Court ordered treatment.     Client's Goal (Objetivo del cliente): \"To complete this program\"  Clinical Goals ([place] del clínico):  Client will gain insight into the value of Acceptance and Surrender as well as increasing internal motivation for change. (El cliente obtendrá jessica comprensión del valor de la Aceptación y la Rendición, así tian un aumento en la motivación interna para el cambio.)        Date/ Initials Methods/Interventions  (Métodos/Intervenciones) Target Date Extended Date Extended Date Stopped Completed Initials   May 19, 2025 Client will meet individually with [facility] on a bi-weekly basis to review and update treatment plan goals, progress, and/or concerns  El cliente se reunirá individualmente con [facility] de manera quincenal para revisar y actualizar los objetivos del plan de tratamiento, el progreso y/o las preocupaciones.  8/3/25            May 19, 2025 Client will attend LADC led AMISH group " "2x/wk for a total of 6 hours, during phase II.  El cliente asistirá al timmy MAISH dirigido por LADC 2 veces por semana breanna un total de 6 horas, breanna la fase II.  7/1/25         May 19, 2025 Client will increase motivation for change by identifying 10 harmful consequences relating to use and share insight with peers and staff during group session.  El cliente aumentará la motivación para el cambio identificando 10 consecuencias perjudiciales relacionadas con el uso y compartiendo ideas con compañeros y personal breanna la sesión grupal.  6/11/25      Completed MV                      DIMENSION 5: Relapse/Continued Problem Potential - (Recaída/Potencial de Problemas Continuos)   Current Risk: 2    Initial Risk Rating: 3  (Calificación de Riesgo Inicial: 3)      Identified areas of concern/focus:  Lack of knowledge/coping skills related to to relapse triggers and coping strategies     As Evidenced by:  Client lacks coping skills for relapse prevention.        Client's Goal (Objetivo del cliente): \"To keep alcohol out of my home\"  Clinical Goals ([place] del clínico: Client will gain insight into personal early warning signs, triggers, and urges as well as develop healthy strategies to reduce risk of relapse/continued use. (El cliente obtendrá información sobre las señales de advertencia personales, los desencadenantes y los impulsos, así tian desarrollará estrategias saludables para reducir el riesgo de recaída o uso continuo).     Client has established and utilizes a relapse prevention plan.  Must be reached in order to have services terminated? (El cliente ha establecido y utiliza un plan de prevención de recaídas.  Debe ser alcanzado para que se terminen los servicios?.)  Must be reached in order to have services terminated? Yes    Target Date: 8/3/25            Date/ Initials Methods/Interventions  (Métodos/Intervenciones) Target Date Extended Date Extended Date Stopped Completed Initials      May 21, 2025 " "Client will increase awareness of personal triggers and cravings by rating his cravings and verbalizing triggers he has experienced each week during group.   El cliente aumentará la conciencia sobre lila desencadenantes y antojos personales al calificar lila antojos y verbalizar los desencadenantes que fong experimentado cada semana breanna el timmy. 8/3/25         May 21, 2025 Client will increase awareness of personal triggers by completing assignment on  Internal and external triggers, and share insight with peers and staff during group session.  El cliente aumentará la conciencia de lila desencadenantes personales al completar jessica tarea sobre desencadenantes internos y externos, y compartirá lila ideas con compañeros y personal breanna la sesión grupal. 25 I will reduce risk of relapse by asserting boundaries with people in my life that I used to drink with.   Reduciré el riesgo de recaída estableciendo límites con las personas en mi curtis con las que solía beber. 25 I will develop healthier after work habits by coming home and practice self care and quality time with children.   Desarrollará hábitos más saludables después del trabajo al llegar a casa y practicar el cuidado personal y el tiempo de calidad con los niños. 25            DIMENSION 6: Recovery Environment - (Entorno de recuperación)   Current Risk Ratin    Initial Risk Ratin  (Calificación de Riesgo Inicial: 2)     Identified areas of concern/focus: Lack of sober support (falta de apoyo sobrio)  Chemical use by peer group  Lack of sober / recreational interests  Legal issues     As evidenced by:    Client reports most peer group uses.    Clients lacks sober activities.       Client's Goal (Objetivo del cliente): \"I will increase time with family and spend more time at home.\" (\"Aumentaré el tiempo con la therese y pasaré más tiempo en casa\".)  Clinical Goals ([place] del clínico:): To develop healthier " habits and improve relationships with his family. (Desarrollar hábitos más saludables y mejorar las relaciones con vásquez therese.)     Establish a transition plan connecting to culturally informed services in the community for post-treatment follow up care.  Must be reached in order to have services terminated?  Yes  Target Date:  8/3/25  (Establecer un plan de transición que conecte con servicios culturalmente informados en la comunidad para el seguimiento post-tratamiento.  Debe alcanzarse para que los servicios se terminen? Sí Fecha objetivo: **)           Date/ Initials Methods/Interventions  (Métodos/Intervenciones) Target Date Extended Date Extended Date Stopped Completed Initials   May 21, 2025 Client will increase daily structure and routine by participating in at least 1 sober recreational activity per week.   El cliente aumentará la estructura y la rutina diaria al participar en al menos 1 actividad recreativa sobria por semana. 7/10/25            6/4/25 I will attend Jew with my family 1x/wk.   (Asistiré a la keaton con mi therese jessica vez por semana.) 8/1/25 6/4/25 I will spend quality time with my son at least 1x/wk.   (Pasaré tiempo de calidad con mi hijo al menos 1 vez por semana.) 8/15/25         6/4/25 I will share thoughts and feelings with my mom about how well I am doing since not drinking. by video chatting   (Seré abierto y honesto con mi mamá respecto a las preocupaciones sobre el consumo de alcohol mediante jessica videollamada.)    7/4/25 6/4/25 I will practice better self habits at work by taking at least one 15 min. Break each work shift.  (Practicaré mejores hábitos personales en el trabajo tomando al menos un descanso de 15 minutos en cada turno.)  7/4/25 6/18/25 I will attend at least 1  self help meeting and share insight with staff during individual session  (Asistiré a al menos jessica reunión de autoayuda  y compartiré mis ideas con el personal breanna la  sesión individual). 7/18/25              Client Strengths: [place] del Cliente: Motivated, hardworking, family oriented, intelligent, and willingness for change. (Motivado, trabajador, orientado a la therese, inteligente y con disposición para el cambio.) Client Treatment Plan Adaptations: Adaptaciones del Plan de Tratamiento del Cliente:     Client does not need adjustments at this time.(El cliente no necesita ajustes en jewels momento.)     The following adjustments will be made based on the above identified plan: (Se realizarán los siguientes ajustes según el plan identificado anteriormente:) NA    The following staff have contributed to this plan: FADUMO Horta, [facility]  (El siguiente personal ha contribuido a jewels plan):          Were family/support people involved in the treatment planning?  No - List reason why not:  No specific reason.   ( Estuvieron involucrados familiares/personas de apoyo en la planificación del tratamiento? No - Motivo por el cual no: No hay un motivo específico.)     Resources  Resources to which the patient is being referred for problems when they are to be addressed concurrently by another provider: No Referrals Needed     [x] Contact insurance to ensure referrals are in network     Vulnerable Adult Review   [x] Review of the facility Abuse Prevention plan was reviewed with the patient   [x] No individual abuse plan is necessary   [] In addition to the facility Abuse Prevention plan, an Individual Abuse Plan will be put in place       Hal Landa attests their date of last use as 4/4/25. Client attests they have participated in the creation of this treatment plan. Hal Landa has been provided a copy of this treatment plan and is in agreement with how this plan is written and will be stored in the electronic record.   (Hal Landa atestigua vásquez fecha de último uso tian 4/4/25. El cliente atestigua que ha participado en la creación de jewels plan de  tratamiento. Se le ha proporcionado a Hal Rahman Landa jessica copia de jewels plan de tratamiento y está de acuerdo con la forma en que está redactado jewels plan y se almacenará en el registro electrónico.)     Client Signature:  _______________________________  Date: ____________________     [facility] Signature:  _______________________________  Date: ____________________

## 2025-06-18 NOTE — PROGRESS NOTES
Individual Session Summary   START TIME: 8:00 AM   END TIME: 9:00 AM   Duration: 1 Hour    Hal Landa is a 39 year old male who is being evaluated via in person visit.      Substance Use  Diagnosis:  Alcohol Use Disorder, Severe     Mental Health Diagnosis:  No history of MH diagnosis    Data:  Met with client on this date with assistance of interpretor via telephone, for an individual session focusing on reviewing and updating all 6 dimensions of their individual treatment plan. Client verbalized making progress relating to each strategy outlined in his treatment plan. New Strategies were added to dimensions 2 and 6. Due to significant progress made on Treatment plan goals, risk ratings were improved in the following dimensions: 2, 3, 4, and 5.     Intervention: 6 dimensions, Q and A, DBT, MI, life skills, goal oriented,  services     Assessment: Client actively participated in the session and shared appropriate feedback     Plan. Client will continue to complete any/all methods/strategies outlined in their TP.         6/12/2025     1:00 PM   Suicide Ideation Check In   Since last session, how often have you had suicidal thoughts? No thoughts of suicide         Attestation: Haven Wise MD- Provides oversight and supervision of care.    LETY Horta

## 2025-06-19 ENCOUNTER — HOSPITAL ENCOUNTER (OUTPATIENT)
Dept: BEHAVIORAL HEALTH | Facility: CLINIC | Age: 40
End: 2025-06-19
Attending: FAMILY MEDICINE
Payer: COMMERCIAL

## 2025-06-19 PROCEDURE — H2035 A/D TX PROGRAM, PER HOUR: HCPCS

## 2025-06-19 NOTE — PROGRESS NOTES
Individual Session Summary         Client participated in an individual session with primary [facility] in lieu of group session due to low group census.            START TIME: 9:00 AM   END TIME: 10:30 AM   Duration: 1.5 Hours    Topic:  Neurobiology- Pleasure      This topic will give a general overview of the brain chemistry involved with addiction and reasons why addiction is considered a disease vs choice.     Objective(s):     Client will share their opinion on whether addiction is a disease or a choice and explain their answer     Client will identify which parts of the brain are affected by addiction/use.     Client be able to identify purpose of serotonin and dopamine, and how each are impacted by substance use.     Client will gain a better understanding for how addiction rewires our pleasure pathways/reward system.      Structure:     Provide psychoeducation on neurotransmitters and brain regions involved in addiction to illustrate how frontal cortex executive functioning is diminished by substance use disorder.     Facilitate individual discussion around patient s current beliefs of whether addiction is a disease or a moral failing and how that may have changed.     Use teach-back techniques to ensure patients  understanding.     Show an educational video.     Expected therapeutic outcomes:      Better understand the disease concept of addiction.     Understand how substance use effects the brain leading to being more effective at coping with withdrawal symptoms.      Therapeutic outcome(s) measured by:     Patient s ability to teach-back information learned in session.     Data:  Met with client on this date for an individual session focusing on topic listed above. He completed check in questions rating the following on a scale of 0-10 (10 being highest) Depression: 2  Anxiety: 0 Motivation: 10 and Cravings: 0 (7 for nicotine). Client verbalized gaining the following insight from this session: That drinking  leads to making poor decisions, addiction to dopamine release, and the importance of continuing to work on making positive life changes in order to strengthen the frontal cortex.         6/18/2025    11:00 AM   Suicide Ideation Check In   Since last session, how often have you had suicidal thoughts? No thoughts of suicide         Lisette Handy, BS, LADC

## 2025-06-25 ENCOUNTER — HOSPITAL ENCOUNTER (OUTPATIENT)
Dept: BEHAVIORAL HEALTH | Facility: CLINIC | Age: 40
Discharge: HOME | End: 2025-06-25
Attending: FAMILY MEDICINE
Payer: COMMERCIAL

## 2025-06-25 ENCOUNTER — TELEPHONE (OUTPATIENT)
Dept: BEHAVIORAL HEALTH | Facility: CLINIC | Age: 40
End: 2025-06-25
Payer: COMMERCIAL

## 2025-06-25 PROCEDURE — H2035 A/D TX PROGRAM, PER HOUR: HCPCS

## 2025-06-25 NOTE — PROGRESS NOTES
Individual Note   Start: 9:10 AM  End:  10:25 AM    Client participated in an individual session with primary LADC in lieu of group session due to low group census.     Dimensions addressed: 5 and 6    Summary of Group / Topics Discussed:    The Brain Science of Relapse in Addiction: Provided a psychoeducation group focusing on the science of relapse. Reviewed client's overall understanding of the role of the frontal cortex and the midbrain in addiction and relapse. Provided a definition of addiction and how addiction is a disease. Reviewed the reward pathway system with clients and how in addiction the midbrain (the functions of the midbrain that are related to emotion and survival) are involved the  Hijack  of the frontal cortex or executive decision-making part of the brain. Provide psychoeducation on neurotransmitters and brain regions involved in addiction to illustrate how frontal cortex executive functioning is diminished by substance use disorder, while with addiction and cravings the midbrain dominates decision making. Facilitated a group discussion client's current belief of whether addiction is a disease or a moral failing and how that may have changed. Facilitated a group discussion around each patient's current symptomology. Engaged in a discussion with client's around triggers and benefits of sober support as a protective factor to not have a recurrence of use and to help maintain sobriety. Utilized an educational video and introduced client to a smart phone juan.     Group Objectives/Goals    Client will be able to define addiction.  Client will identify basic brain regions involved with addiction.  Client will identify how the frontal cortex is hijacked by the midbrain (or primitive survival brain) in addiction   Client will gain an understand regarding the term  hijacked  and it's application to addiction.  Client will be able to identify triggers and how sober support meetings can play a role in  "stress and craving reduction to prevent a recurrence of substance use    D: Writer met with client for the first time in this session to facilitate an individual session that focused on what brought client to treatment and how are things going in sobriety. He was offered and declined having an  for today's session.  He shared about having stayed sober since April of 2025 and how he is reconnecting with his wife and three children and becoming the father he had hoped he could become. He shared about the challenges involved in supporting his family here and helping out the members of his family that live elsewhere. He works two jobs and devotes his weekends now to spending time with family. Client shared that he hopes to work less but has financial obligations to his family locally here and his family that is not local. He shared that he is thinking of attending sober support meetings and is looking for meetings that are culturally relevant to him. He has a friend who attends sober support meetings that meet his needs and he is planning on attending a sober support meeting with him. Client has a very realistic understanding of the disease of alcoholism and that even though it may have been helpful at first now the costs and consequences of drinking are too expensive in many ways. He expressed that his children are growing up and that being a sober dad he hopes to model a sober life not the drinking life he had modeled. He expressed that he loves his family and his community and that he would rather be known as a sober juancarlos in his community than not be a part of his community. He expressed he is ok with being a person who has had the problem and is doing well today, he shared that he is already benefiting from the positive consequences of his sobriety.     I: Active listening, MI, Educational, introducing the \"Next Meeting\" juan and showing client to use the features to find the specific meeting he wants, " Psychoeducational video on the science of relapse and discussion     A: Client was an active participant in today's session and appears highly motivated to stay sober and to not only maintain his sobriety but maintain his sober role as the  to his wife and father to his children.     P: Client to continue to attend treatment groups as scheduled, attend his July 10, 2025 court date as scheduled for his DWI. Client has a plan of the meetings he wants to attend for sober support and even the day he wants to attend. Next week client will inform his focal counselor the day he will attend his first sober support meeting.     Attestation:  Haven Wise MD - Provides oversight and supervision of care.        6/25/2025     4:00 PM   Suicide Ideation Check In   Since last session, how often have you had suicidal thoughts? No thoughts of suicide               LETY Mccoy 6/25/2025 2:14 PM

## 2025-06-25 NOTE — TELEPHONE ENCOUNTER
----- Message from Philippe Vigil sent at 6/25/2025 11:59 AM CDT -----  Regarding: Please add two sessions of Phase 2 treatment group  Contact: [phone]  Scheduling Request    Patient Name: Hal Landa  Location of programming: [facility], [address] [place] [id number]  Start Date: June / 25 / 2025  Group: [id number] on Wednesday and Thursday at 9:00 AM to 11:00 AM  Attending Provider (MD): Dr Wise  Number of visits to be scheduled: 2          Duration of Appointment in minutes: 2 Hours  Visit Type: In-person or Treatment - 870    Additional notes: Please add these two days to patients Phase 2 Treatment Schedule.      Thanks,    Nolan Vigil

## 2025-06-26 ENCOUNTER — HOSPITAL ENCOUNTER (OUTPATIENT)
Dept: BEHAVIORAL HEALTH | Facility: CLINIC | Age: 40
End: 2025-06-26
Attending: FAMILY MEDICINE
Payer: COMMERCIAL

## 2025-06-26 PROCEDURE — H2035 A/D TX PROGRAM, PER HOUR: HCPCS

## 2025-06-26 NOTE — ADDENDUM NOTE
Encounter addended by: Philippe Vigil LADC on: 6/26/2025 12:47 PM   Actions taken: Clinical Note Signed

## 2025-06-26 NOTE — PROGRESS NOTES
Individual Note   Start: 9:05 AM  End:  10:24    Dimensions addressed: 5 and 6    Summary of Group / Topics Discussed:      Process Group: Facilitated a process group surrounding client's experiences with substance use, mental health, and recovery. Provided clients the space and structure to engage in the process and allow client's to engage with their peers. Provided client's with support and insight to and various skills to utilize to address their concerns and how to promote recovery. Provided clients space to process their various treatment assignments and reciveve feedback from their peers regarding their assignments.      Objective(s):   Process group allows clients who abuse substances and have mental health symptoms to share in their recovery with others.  Client's will have the opportunity to problem solve with peers and learn and implement valuable coping skills for relapse prevention and daily life issues.   Client will engage in a discussion to develop insight on their substance use, mental health, and recovery.  Client will have the opportunity to learn from their peers regarding their recovery and mental health journey.   Client will identify what they learned and gain insight into their use and mental health   Client will process their treatment assignments and process what they learned from the assignment.     Expected therapeutic outcomes:    Build recovery resilience against cravings, leading to resilience against relapse in early sobriety.   Continue to build motivation to be vulnerable, honest, and building trust.  Growth in developing optimal environment and in managing difficult emotions  Build recovery resilience against cravings, leading to resilience against relapse in early sobriety.    D: Writer met with client for the second day in a row, client wanted to work on relapse prevention and sober environment treatment plan goals. He was offered and declined having an  for today's  "session. The session began with client checking in and reporting ongoing sobriety since April of 2025. He discussed the strategies he has learned for coping with stressors, anxiety and cravings since he began treatment. He shared he has learned to be mindful and practice breathing exercises to stay calm. He manages a large group of employees and has found these stress and craving reduction tools are helpful to him. He shared that when he feels triggered to drink or has cravings to drink he is able look at his past and play the tape through to the end with his current situation. He shared that a bigger focus in his life now is enjoying time with his family. He shared he is looking forward to an upcoming canoe trip and camp out with his wife.He watched an educational video on what 12 step sober support meetings can be like and was given information on Healthy Versus Unhealthy sober support meetings followed by interactive discussion. He discussed his plans of action for how he will arrange to attend his first outside sober support meeting with a friend in the near future. Client shared that his work life is going well and that he looks forward to beginning into Phase 3 as he believes he has met the requirement to be done with phase 2 and move onto phase 3.     I: Active listening, MI, Educational Video, introducing the \"Hope\" video, an introduction to Alcoholics Anonymous meetings and the AA program of recovery,      A:  Client was an active participant in today's session and appears highly motivated to stay sober and to not only maintain his sobriety but to continue to enjoy and maintain his sober role as the  to his wife and father to his children. 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        P: Continue to attend sessions as scheduled, attend an outside sober support meeting within two weeks and report back to group and focal counselor how it was for you.       Attestation:  Haven Wise MD - Provides oversight and supervision of care.      LETY Mccoy 6/26/2025 2:22 PM        6/26/2025    12:00 PM   Suicide Ideation Check In   Since last session, how often have you had suicidal thoughts? No thoughts of suicide

## 2025-07-01 ENCOUNTER — HOSPITAL ENCOUNTER (OUTPATIENT)
Dept: BEHAVIORAL HEALTH | Facility: CLINIC | Age: 40
Discharge: HOME | End: 2025-07-01
Attending: FAMILY MEDICINE
Payer: COMMERCIAL

## 2025-07-01 ENCOUNTER — TELEPHONE (OUTPATIENT)
Dept: BEHAVIORAL HEALTH | Facility: CLINIC | Age: 40
End: 2025-07-01
Payer: COMMERCIAL

## 2025-07-01 PROCEDURE — H2035 A/D TX PROGRAM, PER HOUR: HCPCS | Mod: HQ

## 2025-07-01 PROCEDURE — H2035 A/D TX PROGRAM, PER HOUR: HCPCS

## 2025-07-01 NOTE — GROUP NOTE
Group Therapy Documentation    PATIENT'S NAME: Hal Landa  MRN:   [id number]  :   1985  ACCT. NUMBER: [id number]  DATE OF SERVICE: 25  START TIME:  9:00 AM  END TIME: 11:00 AM  FACILITATOR(S): Lisette Handy LADC  TOPIC: BEH Group Therapy  Number of patients attending the group:  6  Group Length:  2 Hours    Dimensions addressed: 1, 2, 3, 4, 5, and 6    Summary of Group / Topics Discussed: Self-Care    Recovery Management Plan:  Client completed Recovery Management Plan handout which develops a plan to help successfully manage recovery. A recovery plan can help control addictive behaviors and prevent relapse by address the four important elements: professional counseling, stress management, proper diet/health/exercise, and support groups. Client engaged in discussion with group regarding their willingness to engage in recommendations, successes in the past, benefits of each recommendation, and consequences of refusing recommendations.     Group Objectives:  Client will complete their recovery management handout to develop plan for remaining successful with their recovery goals outside of treatment  Client will identify the importance of having a recovery plan to promote wellness and avoid reverting to previous, unhealthy behaviors  Client will have the opportunity to learn from peers and their recovery plans to help with creativity when developing their own plan    Group Attendance:  Attended group session    Patient's response to the group topic/interactions:  cooperative with task, discussed personal experience with topic, expressed understanding of topic, gave appropriate feedback to peers, and listened actively    Patient appeared to be Actively participating, Attentive, and Engaged.        Client specific details:  Client has rated the following on a scale of 0-10 (10 being highest), Depression 2, anxiety 1, motivation 10, and cravings 1. He denied any use episodes or concerns to  discuss. He answered check- in questions stating affirmations being: I am a good  and I am a good father. Two strengths being: I am a good teacher, and I dedicated. Client was given the self care portion of his recovery care plan and we completed healthy eating section during group. Client was asked to complete he remainder of the worksheet prior to next group session.           7/1/2025     1:00 PM   Suicide Ideation Check In   Since last session, how often have you had suicidal thoughts? No thoughts of suicide         Lisette Handy, FADUMO, LADC

## 2025-07-01 NOTE — PROGRESS NOTES
Individual Session Summary   START TIME: 12:10AM   END TIME: 1:00PM   Duration: 50 minutes    Hal Landa is a 39 year old male who is being evaluated via in person visit.      Substance Use  Diagnosis:  Alcohol Use Disorder, Severe     Mental Health Diagnosis:  No history of MH diagnosis    Data:  Met with client on this date for an individual session focusing on reviewing and updating all 6 dimensions of their individual treatment plan. Client discussed progress made relating to each dimension. New goals were added in dimensions 4, 5, and 6. Client shared completing a MADD panel this past week and asked if counselor could write him a progress letter before his next court date. Client endorses many of his TP strategies are become more routine and habitual. He is still in the process of finding an AA meeting to attend.     Intervention: 6 dimensions, Q and A, DBT, MI, Affirmations, life skills, goal oriented, relapse prevention     Assessment: Client actively participated in the session and shared appropriate feedback     Plan. Client will continue to complete any/all methods/strategies outlined in their TP.         6/26/2025    12:00 PM   Suicide Ideation Check In   Since last session, how often have you had suicidal thoughts? No thoughts of suicide         Attestation: Haven Wise MD- Provides oversight and supervision of care.    LETY Horta

## 2025-07-01 NOTE — PROGRESS NOTES
"[facility]  MENTAL HEALTH AND ADDICTION     CO-OCCURRING RESIDENTIAL AND IOP TREATMENT PLAN     [facility] LEVEL OF CARE:  1.0 Outpatient Program  Initiated on: May 21, 2025  Updated: 25     DIMENSION 1: Intoxication / Withdrawal Potential (Potencial de Intoxicación / Abstinencia)   Current Risk Ratin    Initial Risk Ratin  (Calificación de Riesgo Inicial: 0)     Identified areas of concern/focus: History of daily use     Client's Goal (Objetivo del cliente): \"To completely leave alcohol behind\" (\"Para completar, yasmani el alcohol atrás.\")  Clinical Goals ([place] del clínico): Client will abstain from any/all mood altering substances unless prescribed by a licensed physician and gain insight into PAWS. (Calificación de riesgo inicial El cliente se abstendrá de cualquier sustancia que altere el estado de ánimo a menos que sea prescrita por un médico licenciado y obtendrá información sobre el PAWS.)           Date/ Initials Methods/Interventions  (Métodos/Intervenciones) Target Date Extended Date Extended Date Stopped Completed Initials   May 21, 2025 I will hold my self accountable by communicating with staff and peers if were to have a use episode   Me haré responsable comunicándome con el personal y compañeros si tuviera un episodio de uso. 8/5/25         May 21, 2025 I will increase my accountability for sobriety by submitting to random UA's or breathalyzer's as requested by staff.   aumentará mi responsabilidad por la sobriedad al someterme a pruebas de detección de sustancias o pruebas de aliento aleatorias según lo solicite el personal. 8/5/25         May 21, 2025 I will understand the short and long term effects of withdrawal symptoms by attending PAWS group topic session and identify both acute and post acute symptoms I have or am currently experiencing  Entenderé los efectos a corto y mary plazo de los síntomas de abstinencia asistiendo a la sesión temática del timmy " "PAWS e identificaré tanto los síntomas agudos tian los post-agudos que tengo o que estoy experimentando actualmente. 25      Completed MV      DIMENSION 2: Biomedical Conditions/Complications - (Condiciones/Complicaciones Biomédicas)   Current Risk Ratin    Initial Risk Ratin  (Calificación de Riesgo Inicial: 1)     Identified areas of concern/focus:  Pre-diabetic.     As evidenced by:    Clients medical charts.     Client's Goal (Objetivo del cliente): \"To take care of Diabetes\" (\"Cuidar la Diabetes\")  Clinical Goals ([place] del clínico):  Client will gain insight into the importance of physical health and how physical health impacts our mental and chemical health. (El cliente obtendrá jessica comprensión de la importancia de la casie física y cómo la casie física impacta nuestra casie mental y química.)           Date/ Initials Methods/Interventions  (Métodos/Intervenciones)) Target Date Extended Date Extended Date Stopped Completed Initials   May 21, 2025 I will practice self care by following up with my PCP for any/all biomedical concerns and/or routine visits as and follow any/all recommendations for medications  Practicaré el cuidado personal haciendo un seguimiento con mi médico de atención primaria para cualquier preocupación biomédica y/o visitas de rutina, y seguiré todas las recomendaciones para los medicamentos. 8/3/25         May 21, 2025 I will practice self care by walking my dog for 1/2 mile 4x/wk.    Practicaré el autocuidado paseando a mi christelle breanna 1/2 lópez 4 veces a la semana. 25      Completed MV   May 21, 2025 I will improve self care habits by eating at least one salad/ 5x/wk  Voy a mejorar mis hábitos de autocuidado comiendo al menos jessica ensalada al día. 25      Completed MV   May 21, 2025 I will improve sleep habits by getting into bed by 11pm each night.   Mejoraré mis hábitos de sueño y me acostaré a las 11 de la noche cada noche. 25      Completed MV    25  I " "will improve my physical health by running at least 30 min 2x/wk. And share progress with staff during individual session. (Mejoraré mi casie física corriendo al menos 30 minutos 2 veces por semana. Y compartiré el progreso con el personal breanna las sesiones individuales).  25 I will cut down sugary drinks to 1 glass/wk. (Voy a reducir las bebidas azucaradas a 1 vaso por semana.) 25   Completed MV   25 I will cut down nicotine use by limiting myself to no more than 3 cigarettes/day. (Reduciré el consumo de nicotina limitándome a no más de 3 cigarrillos por semana.) 25   Completed MV      DIMENSION 3:Emotional/Behavioral Conditions/Complications (Condiciones/Complicaciones Emocionales/Conductuales)   Current Ratin    Initial Risk Ratin  (Calificación de Riesgo Inicial: 2)       Identified areas of concern/focus:   No history of MH     As evidenced by:    Patient Reports     Client's Goal (Objetivo del cliente): \"To be more open about my feelings to others\" (Ser más abierto sobre mis sentimientos hacia los demás\")  Clinical Goals ([place] del clínico):  Client will gain insight into mental health symptoms and be able to identify and utilize healthy strategies to cope with mental health symptoms (El cliente adquirirá información sobre los síntomas de casie mental y podrá identificar y utilizar estrategias saludables para hacer frente a los síntomas de casie mental.)  Client will strengthen protective factors.  Must be reached in order to have services terminated?  Yes  Target Date: 8/3/25   (El cliente fortalecerá los factores de protección.  Se debe alcanzar para que se terminen los servicios? Sí, fecha objetivo:)           Date/ Initials Methods/Interventions (Métodos/Intervenciones) Target Date Extended Date Extended Date Stopped Completed Initials   May 21, 2025 I gain awareness of mental health symptoms by identifying specific mental health symptoms I am experiencing, each " "week and share with staff and peers during group sessions.   (Aumentaré mi conciencia sobre los síntomas de casie mental identificando síntomas específicos de casie mental que estoy experimentando cada semana y compartiré con el personal y compañeros breanna las sesiones grupales.) 8/3/25            May 21, 2025 I will practice healthly strategies to cope with emotions by communicating my feeling with my partner for 10 min at least 5x's/week  Voy a practicar estrategias saludables para manejar las emociones comunicando mis sentimientos con mi elsie 5 veces a la semana, cuando tiburcio me recoge del trabajo. 25                                                         DIMENSION 4: Treatment Acceptance/Resistance - (Aceptación/Resistencia al Tratamiento)   Current Risk Ratin    Initial Risk Ratin  (Calificación de Riesgo Inicial: 2)      Identified areas of concern/focus:    Alcohol Use Disorders;  303.90 (F10.20) Alcohol Use Disorder Severe  Moderate motivation for treatment     As evidenced by:  Meets DSM 5 criteria for substance use disorder.  Court ordered treatment.     Client's Goal (Objetivo del cliente): \"To complete this program\"  Clinical Goals ([place] del clínico):  Client will gain insight into the value of Acceptance and Surrender as well as increasing internal motivation for change. (El cliente obtendrá jessica comprensión del valor de la Aceptación y la Rendición, así tian un aumento en la motivación interna para el cambio.)        Date/ Initials Methods/Interventions  (Métodos/Intervenciones) Target Date Extended Date Extended Date Stopped Completed Initials   May 19, 2025 Client will meet individually with LADC on a bi-weekly basis to review and update treatment plan goals, progress, and/or concerns  El cliente se reunirá individualmente con LADC de manera quincenal para revisar y actualizar los objetivos del plan de tratamiento, el progreso y/o las preocupaciones.  8/3/25            May 19, 2025 " "Client will attend LADC led AMISH group 2x/wk for a total of 6 hours, during phase II.  El cliente asistirá al timmy AMISH dirigido por LADC 2 veces por semana breanna un total de 6 horas, breanna la fase II.  7/1/25      Completed MV   May 19, 2025 Client will increase motivation for change by identifying 10 harmful consequences relating to use and share insight with peers and staff during group session.  El cliente aumentará la motivación para el cambio identificando 10 consecuencias perjudiciales relacionadas con el uso y compartiendo ideas con compañeros y personal breanna la sesión grupal.  6/11/25      Completed MV   7/1/25 Client will attend LADC led AMISH group 1x/wk for a total of 2 hours, during phase III.  El cliente asistirá al timmy AMISH dirigido por LADC 1 undia a las  semana breanna un total de 2 horas, breanna la fase III.  8/23/25               DIMENSION 5: Relapse/Continued Problem Potential - (Recaída/Potencial de Problemas Continuos)   Current Risk: 1    Initial Risk Rating: 3  (Calificación de Riesgo Inicial: 3)      Identified areas of concern/focus:  Lack of knowledge/coping skills related to to relapse triggers and coping strategies     As Evidenced by:  Client lacks coping skills for relapse prevention.        Client's Goal (Objetivo del cliente): \"To keep alcohol out of my home\"  Clinical Goals ([place] del clínico: Client will gain insight into personal early warning signs, triggers, and urges as well as develop healthy strategies to reduce risk of relapse/continued use. (El cliente obtendrá información sobre las señales de advertencia personales, los desencadenantes y los impulsos, así tian desarrollará estrategias saludables para reducir el riesgo de recaída o uso continuo).     Client has established and utilizes a relapse prevention plan.  Must be reached in order to have services terminated? (El cliente ha establecido y utiliza un plan de prevención de recaídas.  Debe ser alcanzado para que se " terminen los servicios?.)  Must be reached in order to have services terminated? Yes    Target Date: 8/3/25            Date/ Initials Methods/Interventions  (Métodos/Intervenciones) Target Date Extended Date Extended Date Stopped Completed Initials      May 21, 2025 Client will increase awareness of personal triggers and cravings by rating his cravings and verbalizing triggers he has experienced each week during group.   El cliente aumentará la conciencia sobre lila desencadenantes y antojos personales al calificar lila antojos y verbalizar los desencadenantes que fong experimentado cada semana breanna el timmy. 8/3/25         May 21, 2025 Client will increase awareness of personal triggers by completing assignment on  Internal and external triggers, and share insight with peers and staff during group session.  El cliente aumentará la conciencia de lila desencadenantes personales al completar jessica tarea sobre desencadenantes internos y externos, y compartirá lila ideas con compañeros y personal breanna la sesión grupal. 25    Completed MV   25 I will reduce risk of relapse by asserting boundaries with people in my life that I used to drink with.   Reduciré el riesgo de recaída estableciendo límites con las personas en mi curtis con las que solía beber. 25      Completed MV   25 I will develop healthier after work habits by coming home and practice self care and quality time with children.   Desarrollará hábitos más saludables después del trabajo al llegar a casa y practicar el cuidado personal y el tiempo de calidad con los niños. 25      Completed MV   25 I will develop a healthy recovery care plan by completing handouts relating to each significant area of my life and share insight with peers and staff during group process.  25         DIMENSION 6: Recovery Environment - (Entorno de recuperación)   Current Risk Ratin    Initial Risk Ratin  (Calificación de Riesgo Inicial: 2)    "  Identified areas of concern/focus: Lack of sober support (falta de apoyo sobrio)  Chemical use by peer group  Lack of sober / recreational interests  Legal issues     As evidenced by:    Client reports most peer group uses.    Clients lacks sober activities.       Client's Goal (Objetivo del cliente): \"I will increase time with family and spend more time at home.\" (\"Aumentaré el tiempo con la therese y pasaré más tiempo en casa\".)  Clinical Goals ([place] del clínico:): To develop healthier habits and improve relationships with his family. (Desarrollar hábitos más saludables y mejorar las relaciones con vásquez therese.)     Establish a transition plan connecting to culturally informed services in the community for post-treatment follow up care.  Must be reached in order to have services terminated?  Yes  Target Date:  8/3/25  (Establecer un plan de transición que conecte con servicios culturalmente informados en la comunidad para el seguimiento post-tratamiento.  Debe alcanzarse para que los servicios se terminen? Sí Fecha objetivo: **)           Date/ Initials Methods/Interventions  (Métodos/Intervenciones) Target Date Extended Date Extended Date Stopped Completed Initials   May 21, 2025 Client will increase daily structure and routine by participating in at least 1 sober recreational activity per week.   El cliente aumentará la estructura y la rutina diaria al participar en al menos 1 actividad recreativa sobria por semana. 7/10/25      Completed MV      6/4/25 I will attend Taoism with my family 1x/wk.   (Asistiré a la keaton con mi therese jessica vez por semana.) 8/1/25      Completed MV   6/4/25 I will spend quality time with my son at least 1x/wk.   (Pasaré tiempo de calidad con mi hijo al menos 1 vez por semana.) 8/15/25      Completed MV   6/4/25 I will share thoughts and feelings with my mom about how well I am doing since not drinking. by video chatting   (Seré abierto y honesto con mi mamá respecto a las " preocupaciones sobre el consumo de alcohol mediante jessica videollamada.)    7/4/25  8/10/25       6/4/25 I will practice better self habits at work by taking at least one 15 min. Break each work shift.  (Practicaré mejores hábitos personales en el trabajo tomando al menos un descanso de 15 minutos en cada turno.)  7/4/25 8/12/25 6/18/25 I will attend at least 1  self help meeting and share insight with staff during individual session  (Asistiré a al menos jessica reunión de autoayuda  y compartiré mis ideas con el personal breanna la sesión individual). 7/18/25              Client Strengths: [place] del Cliente: Motivated, hardworking, family oriented, intelligent, and willingness for change. (Motivado, trabajador, orientado a la therese, inteligente y con disposición para el cambio.) Client Treatment Plan Adaptations: Adaptaciones del Plan de Tratamiento del Cliente:     Client does not need adjustments at this time.(El cliente no necesita ajustes en jewels momento.)     The following adjustments will be made based on the above identified plan: (Se realizarán los siguientes ajustes según el plan identificado anteriormente:) NA    The following staff have contributed to this plan: FADUMO Horta, LADC  (El siguiente personal ha contribuido a jewels plan):          Were family/support people involved in the treatment planning?  No - List reason why not:  No specific reason.   ( Estuvieron involucrados familiares/personas de apoyo en la planificación del tratamiento? No - Motivo por el cual no: No hay un motivo específico.)     Resources  Resources to which the patient is being referred for problems when they are to be addressed concurrently by another provider: No Referrals Needed     [x] Contact insurance to ensure referrals are in network     Vulnerable Adult Review   [x] Review of the facility Abuse Prevention plan was reviewed with the patient   [x] No individual abuse plan is necessary   []  In addition to the facility Abuse Prevention plan, an Individual Abuse Plan will be put in place       Hal Landa attests their date of last use as 4/4/25. Client attests they have participated in the creation of this treatment plan. Hal Landa has been provided a copy of this treatment plan and is in agreement with how this plan is written and will be stored in the electronic record.   (Hal Landa atestigua vásquez fecha de último uso tian 4/4/25. El cliente atestigua que ha participado en la creación de jewels plan de tratamiento. Se le ha proporcionado a Hal Landa jessica copia de jewels plan de tratamiento y está de acuerdo con la forma en que está redactado jewels plan y se almacenará en el registro electrónico.)     Client Signature:  _______________________________  Date: ____________________     [facility] Signature:  _______________________________  Date: ____________________

## 2025-07-01 NOTE — TELEPHONE ENCOUNTER
----- Message from Lisette Handy sent at 6/30/2025  3:53 PM CDT -----  Regarding: Scheduling  Please Schedule    Patient Name and MRN: Hal Landa [id number]  Location of program: [place]  Date: 7/1/25  Time: 11:00 AM  AMISH IOP PHASE 3 MIXED ([id number])    Visit type:  In- Person  Appointment Length: 60 min.   Appointment Reason: Individual  Billing Type: part of program     Thank You!  Lisette Handy

## 2025-07-03 NOTE — ADDENDUM NOTE
Encounter addended by: Lisette Handy LADC on: 7/3/2025 3:00 PM   Actions taken: Clinical Note Signed

## 2025-07-03 NOTE — PROGRESS NOTES
"[facility] Weekly Treatment Plan Review      Treatment plan review for the following date span:  6/6/25--7/3/25      ATTENDANCE: Client had no absences during this date span      Weekly Treatment Plan Review     Treatment Plan initiated on: 5/19/25 (Client did not start group till 5/14/25 due to insurance issues).     Dimension1: Acute Intoxication/Withdrawal Potential -   Client Goals Addressed Since last Review: \"To complete leave alcohol behind\" (\"Para completar, yasmani el alcohol atrás\") and abstain from any/all mood altering substances unless prescribed by a licensed physician and gain insight into PAWS. (Calificación de riesgo inicial El cliente se abstendrá de cualquier sustancia que altere el estado de ánimo a menos que sea prescrita por un médico licenciado y obtendrá información sobre el PAWS.)  Are Treatment Plan goals/methods effective? Yes  Date of Last Use 4/4/25  Any reports of withdrawal symptoms - No    At Intake: Client reports problematic use of alcohol acknowledging history of daily consumption with LDU being 4/4/25. Client shares experiencing acute withdrawal symptoms expressing behaviors of drinking alcohol in the morning, to \"Feel better\". He reports experiencing current post acute withdrawal symptoms such as lack of motivation/interests. Client met with addiction medicine on 5/6/25 and client has a follow up appointment to further discuss options for medications to address alcohol cravings. He denies history of detox. No signs of intoxication were observed. Client submitted to UA. Results were negative for any/all substances.     6/5/25 Update: Client denies any use episodes. No signs or symptoms of intoxication or withdrawal were observed. Client verbalizes complying with court monitoring via breathalyzer 3x/day.     7/23/25 Update:  Client denies any use episodes. No signs or symptoms of intoxication or withdrawal were observed. Client verbalizes complying with court monitoring via " "breathalyzer 3x/day.     Dimension 2: Biomedical Conditions & Complications -   Client Goals Addressed Since last Review: \"To take care of Diabetes\" (\"Cuidar la Diabetes\") and gain insight into the importance of physical health and how physical health impacts our mental and chemical health. (El cliente obtendrá jessica comprensión de la importancia de la casie física y cómo la casie física impacta nuestra casie mental y química.)  Are Treatment Plan goals/methods effective? Yes  Current Medications & Medication Changes:  No current outpatient medications on file.     No current facility-administered medications for this encounter.       At Intake: Client has history of the following biomedical conditions/diagnosis: Elevated liver enzymes, Type 2 diabetes mellitus (without complication, without long-term current use of insulin\",and mild cholesterol elevation. He has a [place] PCP by the name of Alexys Nieto MD and has a [place] prescriber for medication management, ANNE Greene CNP. Client appears able to access medical services as needed. He denies any current biomedical concerns.     6/5/25 Update: Client reported having blood labs completed verbalizing his levels have improved and shows no signs of diabetes. No biomedical issues or concerns reported or observed. Client attended group topic relating to emotion regulation skill PLEASE - Self care in which he completed a self care inventory and identified 25 small daily goals to to improve each area of self care that he scored low in.     7/23/25 Update: Client denies any biomedical issues or concerns. He is making ongoing progress relating to smoking cessation, eating more healthy, and exercising on a more regular basis. He is currently working on the self-care portion of his recovery care plan.      Dimension 3: Emotional/Behavioral Conditions & Complications -   Client Goals Addressed Since last Review: \"To be more open about my feelings to others\" (Ser " "más abierto sobre mis sentimientos hacia los demás\")  Clinical Goals (Olanta del clínico):  Client will gain insight into mental health symptoms and be able to identify and utilize healthy strategies to cope with mental health symptoms (El cliente adquirirá información sobre los síntomas de casie mental y podrá identificar y utilizar estrategias saludables para hacer frente a los síntomas de casie mental.)  Are Treatment Plan goals/methods effective? Yes  The following assessments were completed by patient for this visit:  PHQ9:       4/15/2025    12:00 PM 4/25/2025    10:19 AM 5/6/2025     9:56 AM   PHQ-9 SCORE   PHQ-9 Total Score MyChart  11 (Moderate depression) 5 (Mild depression)   PHQ-9 Total Score 3 11  5        Patient-reported     GAD7:       5/7/2025     2:00 PM   ISABELLA-7 SCORE   Total Score 13     CAGE-AID:        No data to display              PROMIS 10-Global Health (only subscores and total score):       4/15/2025    12:00 PM   PROMIS-10 Scores Only   Global Mental Health Score 12   Global Physical Health Score 14   PROMIS TOTAL - SUBSCORES 26     Patrick Suicide Severity Rating Scale (Short Version)       No data to display              GAIN-sliding scale:      4/15/2025    12:00 PM   When was the last time that you had significant problems...   with feeling very trapped, lonely, sad, blue, depressed or hopeless about the future? Never   with sleep trouble, such as bad dreams, sleeping restlessly, or falling asleep during the day? Past Month   with feeling very anxious, nervous, tense, scared, panicked or like something bad was going to happen? Past month   with becoming very distressed & upset when something reminded you of the past? Past month   with thinking about ending your life or committing suicide? Never          4/15/2025    12:00 PM   When was the last time that you did the following things 2 or more times?   Lied or conned to get things you wanted or to avoid having to do something? Never " "  Had a hard time paying attention at school, work or home? Never   Had a hard time listening to instructions at school, work or home? Never   Were a bully or threatened other people? Never   Started physical fights with other people? Never     Mental health diagnosis No history of MH diagnosis    Date of last SIB:  Denies  Date of  last SI:  Denies  Date of last HI: Denies  Behavioral Targets:  Asserting feeling and thoughts, developing healthy coping strategies, increase internal motivation.     At Intake:  Client denies any history of mental health diagnosis however acknowledged experiencing the following mental health symptoms: Lack of interest to do things, Feeling down/depressed/or hopeless, sleep difficulties, fatigue, feeling back about himself, and difficulty controlling worrying. He completed mental health questionnaires, receiving the following scores: PHQ9- 2, GAD7- 13, [id number]- 26, and C-SSRS- No risk identified. Client lacks healthy strategies to cope with mental health symptoms and lacks impulse control. Client has no previous treatments for mental health services. He lacks insight into mental health and the phenomenon of co-occurring disorders. Client reports history of past trauma due to his father being kidnapped 3 years ago and still missing. Client states, \"I think they killed him\". He endorses coping with situation by increasing his alcohol use. Client would benefit from obtaining a diagnostic assessment to rule out mental health diagnosis. Client denies any history or current thoughts of harm to self or others.     6/5/25 Update: Each week of this date span, client has rated the following on a scale of 0-10 (10 being highest), in chronological order: Depression: 4, 6, and 0. Anxiety: 6, 5, and 4. He reported experiencing Irritation, unmotivated, and over thinking. He coped by smoking a cigarette, talking more, walking, and playing basketball. He actively participates in mindfulness activities " "during each group session and leaning new healthy strategies to improve emotion regulation skills. He denies any thoughts of hurting self or others.     7/23/25 Update: Each week of this date span, client has rated the following on a scale of 0-10 (10 being highest), in chronological order: Depression: 3, 2, 1 and Anxiety: 5, 0, 2 He reported increased appetite and some thinking of past events.  He coped by: focusing more on work, going camping, thinking of the consequences, and taking his dog for walk.  He continues to actively participate in mindfulness activities during each group session and is leaning new healthy strategies to improve emotion regulation skills. He denies any thoughts of hurting self or others.     Dimension 4: Treatment Acceptance / Resistance -   Client Goals Addressed Since last Review: \"To complete this program\" and gain insight into the value of Acceptance and Surrender as well as increasing internal motivation for change. (El cliente obtendrá jessica comprensión del valor de la Aceptación y la Rendición, así tian un aumento en la motivación interna para el cambio.)     Are Treatment Plan goals/methods effective? Yes  [facility] Diagnosis:  Alcohol Use Disorder   303.90 (F10.20) Severe uncomplicated  Commitment to tx process/Stage of change- Contemplation  [facility] assignments - 10 Harmful Consequences and Internal/external triggers    At Intake: Client reports motivation for treatment being:  \"I need to quit for this bad life. The most important thing is to quit drinking so I can enjoy more of my family and to be more healthy\". He endorses external motivators such as: courts, wife, and family. He is able to identify alcohol use having negatively impacted his relationships, employment, recreational activities, physical health, and legal involvement. Client appeared open with his responses and expressed willingness to comply with rules and requirements of the program however lacks consistent behaviors to " "support abstinence.     6/5/25 Update: Client and  met with staff for 2 separate individual sessions to develop his treatment plan. Client has attended all treatment sessions, is respectful to peers and staff, actively participates in both group and individual sessions, and has been putting action towards completing treatment plan goals. He does have two assignments that are considered past due. Client verbalizes he has made the decision to live a life of abstaining from alcohol which shows increased internal motivation since admitting to the program. Foe these reasons, clients rating in this dimension was changed to a 2.     7/23/25 Update: Client has attended all treatment sessions, is respectful to peers and staff, actively participates in both group and individual sessions, and has been putting action towards completing treatment plan goals. He completed phase II and transitioned to phase III as of 6/30/25. Due to ongoing progress in this dimension, his risk rating was changed to 0.     Dimension 5: Relapse / Continued Problem Potential -   Client Goals Addressed Since last Review: \"To keep alcohol out of my home\" and gain insight into personal early warning signs, triggers, and urges as well as develop healthy strategies to reduce risk of relapse/continued use. (El cliente obtendrá información sobre las señales de advertencia personales, los desencadenantes y los impulsos, así tian desarrollará estrategias saludables para reducir el riesgo de recaída o uso continuo).  Are Treatment Plan goals/methods effective? Yes  UA results - No results found for this or any previous visit (from the past 4 weeks).    At Intake: Client reports completing a DWI class in 2019. He denies any other CD treatments. Client reports longest period of sobriety was 3 months, in 2020 sharing return to use was due to triggers/cravings and coping with his Dad's disappearance. Client endorses current period of sobriety is due to " "having to submit to 3 breathalyzers/day as per court monitoring. He endorses biggest trigger for use being \"Habits\" as he would consistently drink as soon as he was home from work. Client endorses concern and curiosity in how to cope with this trigger. Client remains at high risk for relapse for reasons including but not limited to history of continued use despite negative consequences, history of daily use, lacking insight into addiction/mental health triggers/symptoms, lack of healthy strategies to cope with mental health/chemical health symptoms, lack of sober support system and/or sober activities, and ongoing participation in risky behaviors such as DWI     6/5/25 Update: Each week of this date span, client has rated the following on a scale of 0-10 (10 being highest), in chronological order: Cravings: 3-4, 0, and 2. Triggers identified are:  basketball, concerns for his mom, disappointment with boss, and anger. He coped by Drinking water, thinking of consequences, and talking  He attended [facility] groups gaining insight into the following topics: Stages of Relapse, Cognitive distortions, correcting cognitive distortions, emotion regulation (self care), value inventory, and interpersonal effectiveness skills (MING, GIVE, and FAST). He reports continuing to submit to 3 breathalizers per day/ per court conditions.     7/23/25 Update: Each week of this date span, client has rated the following on a scale of 0-10 (10 being highest), in chronological order: Cravings: 2, 6, and 3. Triggers identified are:  Justifying thoughts to use, playing basketball with friends and attending a Quinceañera and graduation party. He coped by distracting his attention towards helping with the party, thinking of consequences, and finding alternative to drink.     Dimension 6: Recovery Environment -   Client Goals Addressed Since last Review: No goals establish in this dimension thus far. Will identify goals during individual session on " 6/4/25.  Are Treatment Plan goals/methods effective? NA  Family Involvement - None at this time.    At Intake: Client is a 39 year old, [place], male who's primary language is Martiniquais. He resides in own home with his wife of 23 years and their 3 minor children. Client reports having to work 2-full-time jobs to support his family. Client endorses wife and kids continue to express their concern for his drinking adding his drinking is negatively impacting his family and their peace of mind. Client reports history of two DWI's, one of which he has upcoming court dates for. He also verbalizes tendency to drive after he has been drinking, which is the main concern for his family. Client lacks sober peer support as majority of his friends drink. He does participate in playing basketball with friends 1x/wk however endorses drinking was/is involved. Client acknowledges struggling to ask for help and asserting his thoughts and feelings to others, especially his wife.     6/5/25 Update: Client has been making progress relating to improving family relationships by communicating more assertively and increasing quality family time. He is also practicing self care by asserting himself to his boss and making sure to take breaks at work. He appears to be complying with any/all conditions of courts and realizing the need for setting boundaries with family and friends that drink.     7/23/25 Update:     Progress made on transition planning goals: See above dimension updates.    Dimension Scale Review     Prior ratings: Dim1 - 0 DIM2 - 1 DIM3 - 2 DIM4 - 2 DIM5 - 3 DIM6 - 2     Current ratings: Dim1 - 0 DIM2 - 0 DIM3 - 2 DIM4 - 1 DIM5 - 3 DIM6 - 2     Justification for Continued Treatment at this Level of Care: Lack of education in addiction and mental health triggers, early warning signs, and healthy coping strategies. His tendency to avoid his thoughts and feelings,  and his reluctance  to asking others for help. He lacks sober  support system and majority of their outside family's drink..     Others involved since last review:   services  Need for peer recovery support referral? No    Discharge Planning:  Target Discharge Date/Timeframe:  8/12/25  Target Phase 3 Start: 6/23/25   Med Mgmt Provider/Appt: Cancelled upcoming appt.   Ind therapy Provider/Appt:  TBD   Family therapy Provider/Appt:  TBD   Other referrals:  TBD        Is patient a vulnerable adult?  No    *Client received copy of changes: Yes  *Client is aware of right to access a treatment plan review: Yes    Resources  Resources to which the patient is being referred for problems when they are to be addressed concurrently by another provider:  services     [x] Contact insurance to ensure referrals are in network    Vulnerable Adult Review   [x] Review of the facility Abuse Prevention plan was reviewed with the patient   [x] No individual abuse plan is necessary   [] In addition to the facility Abuse Prevention plan, an Individual Abuse Plan will be put in place      FADUMO Horta, LADC

## 2025-07-08 ENCOUNTER — TELEPHONE (OUTPATIENT)
Dept: BEHAVIORAL HEALTH | Facility: CLINIC | Age: 40
End: 2025-07-08
Payer: COMMERCIAL

## 2025-07-08 ENCOUNTER — HOSPITAL ENCOUNTER (OUTPATIENT)
Dept: BEHAVIORAL HEALTH | Facility: CLINIC | Age: 40
Discharge: HOME | End: 2025-07-08
Attending: FAMILY MEDICINE
Payer: COMMERCIAL

## 2025-07-08 PROCEDURE — H2035 A/D TX PROGRAM, PER HOUR: HCPCS

## 2025-07-08 PROCEDURE — H2035 A/D TX PROGRAM, PER HOUR: HCPCS | Mod: HQ

## 2025-07-08 NOTE — GROUP NOTE
Group Therapy Documentation    PATIENT'S NAME: Hal Landa  MRN:   [id number]  :   1985  ACCT. NUMBER: [id number]  DATE OF SERVICE: 25  START TIME:  9:00 AM  END TIME: 11:00 AM  FACILITATOR(S): Lisette Handy LADC  TOPIC: BEH Group Therapy  Number of patients attending the group:  5  Group Length:  2 Hours    Dimensions addressed: 1, 2, 3, 4, 5, and 6    Summary of Group / Topics Discussed: (Self- Care)    Recovery Management Plan:  Client completed Recovery Management Plan handout which develops a plan to help successfully manage recovery. A recovery plan can help control addictive behaviors and prevent relapse by address the four important elements: professional counseling, stress management, proper diet/health/exercise, and support groups. Client engaged in discussion with group regarding their willingness to engage in recommendations, successes in the past, benefits of each recommendation, and consequences of refusing recommendations.     Group Objectives:  Client will complete their recovery management handout to develop plan for remaining successful with their recovery goals outside of treatment  Client will identify the importance of having a recovery plan to promote wellness and avoid reverting to previous, unhealthy behaviors  Client will have the opportunity to learn from peers and their recovery plans to help with creativity when developing their own plan    Group Attendance:  Attended group session     Patient's response to the group topic/interactions:  cooperative with task, discussed personal experience with topic, expressed readiness to alter behaviors, expressed understanding of topic, and listened actively     Patient appeared to be Actively participating, Attentive, and Engaged.         Client specific details: In-person interpretor was scheduled however did not show.  Client shared weekly check-in questions rating the following on a scale of 0-10 (10 being highest)  Depression 2, Anxiety 8 (Relating to upcoming court), Motivation 10, and Cravings 1. No use episodes or concerns reported. Client took turns sharing his completed self-care, portion of his recovery care plan, with fellow peers. Client was able to identify progress he has made in each area of self care, additional changes to help improve each area, and identified how a return to use would negatively impact each. Client was given the next section of his Recovery care plan (Relapse Prevention - part 1) to complete for next group session.         7/8/2025     2:00 PM   Suicide Ideation Check In   Since last session, how often have you had suicidal thoughts? No thoughts of suicide        Lisette Handy, BS, LADC  .

## 2025-07-08 NOTE — TELEPHONE ENCOUNTER
----- Message from Lisette Handy sent at 7/7/2025  4:12 PM CDT -----  Regarding: Scheduling  Please Schedule    Patient Name and MRN: Hal Landa [id number]  Location of program: [place]  Date: 7/7/25  Time: 11:00 AM  [facility] IOP PHASE 3 MIXED ([id number])    Visit type:  In- Person  Appointment Length: 60 min.   Appointment Reason: Individual  Billing Type: part of program     Thank You!  Lisette Handy

## 2025-07-09 ENCOUNTER — BEH TREATMENT PLAN (OUTPATIENT)
Dept: BEHAVIORAL HEALTH | Facility: CLINIC | Age: 40
End: 2025-07-09

## 2025-07-15 ENCOUNTER — HOSPITAL ENCOUNTER (OUTPATIENT)
Facility: CLINIC | Age: 40
Discharge: HOME | End: 2025-07-15
Attending: FAMILY MEDICINE
Payer: COMMERCIAL

## 2025-07-15 ENCOUNTER — TELEPHONE (OUTPATIENT)
Dept: BEHAVIORAL HEALTH | Facility: CLINIC | Age: 40
End: 2025-07-15
Payer: COMMERCIAL

## 2025-07-15 PROCEDURE — H2035 A/D TX PROGRAM, PER HOUR: HCPCS | Mod: HQ

## 2025-07-15 PROCEDURE — H2035 A/D TX PROGRAM, PER HOUR: HCPCS

## 2025-07-15 NOTE — PROGRESS NOTES
"Individual Session Summary   START TIME: 11:00AM   END TIME: 11:35AM   Duration: 35 min    Hal Landa is a 39 year old male who is being evaluated via in person visit.      Substance Use  Diagnosis:  Alcohol Use Disorder, Severe     Mental Health Diagnosis:  No history of MH diagnosis      Data:  Met with client on this date for an individual session focusing on dimensions 4 and 5 of his individual recovery care plan. Client was introduced to \"Goodbye to alcohol\" letters via writer sharing multiple examples and watching a \"How to\" video which gave key points and suggestions for how to write a goodbye letter. Client took notes, we discussed additional examples for each area. Client began writing his letter.     Intervention: Q and A, MI, DBT, CBT, Affirmations, encouragement, and healthy communication/relationship skills     Assessment: Client actively participated and shared appropriate feedback.     Plan. Client will complete his letter at home and bring it with him to next group session to share with peers and staff.         7/8/2025     2:00 PM   Suicide Ideation Check In   Since last session, how often have you had suicidal thoughts? No thoughts of suicide       Attestation: Haven Wise MD- Provides oversight and supervision of care.    LETY Horta      "

## 2025-07-15 NOTE — TELEPHONE ENCOUNTER
----- Message from Lisette Handy sent at 7/15/2025  8:58 AM CDT -----  Regarding: Scheduling  Please Schedule    Patient Name and MRN: Hal Landa [id number]  Location of program: [place]  Date: 7/15/25  Time: 11:00 AM  [facility] IOP PHASE 3 MIXED ([id number])    Visit type:  In- Person  Appointment Length: 60 min.   Appointment Reason: Individual  Billing Type: part of program     Thank You!  Lisette Handy

## 2025-07-15 NOTE — GROUP NOTE
Group Therapy Documentation    PATIENT'S NAME: Hal Landa  MRN:   [id number]  :   1985  ACCT. NUMBER: [id number]  DATE OF SERVICE: 7/15/25  START TIME:  9:00 AM  END TIME: 11:00 AM  FACILITATOR(S): Lisette Handy LADC  TOPIC: BEH Group Therapy  Number of patients attending the group:  5  Group Length:  2 Hours    Dimensions addressed: 1, 2, 3, 4, 5, and 6    Summary of Group / Topics Discussed:    Recovery Management Plan - Relapse Prevention Part 1:  Client completed Recovery Management Plan handout which develops a plan to help successfully manage recovery. A recovery plan can help control addictive behaviors and prevent relapse by address the four important elements: professional counseling, stress management, proper diet/health/exercise, and support groups. Client engaged in discussion with group regarding their willingness to engage in recommendations, successes in the past, benefits of each recommendation, and consequences of refusing recommendations.     Group Objectives:  Client will complete their recovery management handout to develop plan for remaining successful with their recovery goals outside of treatment  Client will identify the importance of having a recovery plan to promote wellness and avoid reverting to previous, unhealthy behaviors  Client will have the opportunity to learn from peers and their recovery plans to help with creativity when developing their own plan    Group Attendance:  Attended group session    Patient's response to the group topic/interactions:  cooperative with task, discussed personal experience with topic, expressed understanding of topic, listened actively, and offered helpful suggestions to peers    Patient appeared to be Actively participating, Attentive, and Engaged.        Client specific details:  Client answered check-in questions rating the following on a scale of 0-10 (10 being highest). Depression 2, anxiety 2, motivation 9, and cravings 2.  He identified feeling happy and scared (scared relating to court issues). Client completed the relapse prevention portion of his recovery care plan. He took turns sharing his responses with peers and staff and turned the assignment into staff. Client then participated in a graduation ceremony for fellow peer and shared encouraging and supportive feedback to peer.         7/15/2025     2:00 PM   Suicide Ideation Check In   Since last session, how often have you had suicidal thoughts? No thoughts of suicide       Lisette Handy, FADUMO, LADC

## 2025-07-21 ENCOUNTER — TELEPHONE (OUTPATIENT)
Dept: BEHAVIORAL HEALTH | Facility: CLINIC | Age: 40
End: 2025-07-21
Payer: COMMERCIAL

## 2025-07-21 NOTE — TELEPHONE ENCOUNTER
----- Message from Lisette Handy sent at 7/21/2025  7:50 AM CDT -----  Regarding: Scheduling  Please Schedule    Patient Name and MRN: Hal Landa [id number]  Location of program: [facility]  Every Tuesday  Starting Date: 7/22/25  Time:11:00 AM  # of sessions: 4  AMISH IOP PHASE 3 MIXED ([id number])    Visit type:  In- Person  Appointment Length: 60 min.   Appointment Reason: Individual  Billing Type: part of program     Thank You!  Lisette Handy

## 2025-07-22 ENCOUNTER — BEH TREATMENT PLAN (OUTPATIENT)
Dept: BEHAVIORAL HEALTH | Facility: CLINIC | Age: 40
End: 2025-07-22
Payer: COMMERCIAL

## 2025-07-22 ENCOUNTER — HOSPITAL ENCOUNTER (OUTPATIENT)
Facility: CLINIC | Age: 40
Discharge: HOME | End: 2025-07-22
Attending: FAMILY MEDICINE
Payer: COMMERCIAL

## 2025-07-22 PROCEDURE — H2035 A/D TX PROGRAM, PER HOUR: HCPCS

## 2025-07-22 PROCEDURE — H2035 A/D TX PROGRAM, PER HOUR: HCPCS | Mod: HQ

## 2025-07-22 NOTE — PROGRESS NOTES
"[facility]  MENTAL HEALTH AND ADDICTION     CO-OCCURRING RESIDENTIAL AND IOP TREATMENT PLAN     [facility] LEVEL OF CARE:  1.0 Outpatient Program  Initiated on: May 21, 2025  Updated: 25     DIMENSION 1: Intoxication / Withdrawal Potential (Potencial de Intoxicación / Abstinencia)   Current Risk Ratin     Initial Risk Ratin  (Calificación de Riesgo Inicial: 0)     Identified areas of concern/focus: History of daily use     Client's Goal (Objetivo del cliente): \"To completely leave alcohol behind\" (\"Para completar, yasmani el alcohol atrás.\")  Clinical Goals ([place] del clínico): Client will abstain from any/all mood altering substances unless prescribed by a licensed physician and gain insight into PAWS. (Calificación de riesgo inicial El cliente se abstendrá de cualquier sustancia que altere el estado de ánimo a menos que sea prescrita por un médico licenciado y obtendrá información sobre el PAWS.)           Date/ Initials Methods/Interventions  (Métodos/Intervenciones) Target Date Extended Date Extended Date Stopped Completed Initials   May 21, 2025 I will hold my self accountable by communicating with staff and peers if were to have a use episode   Me haré responsable comunicándome con el personal y compañeros si tuviera un episodio de uso. 8/5/25         May 21, 2025 I will increase my accountability for sobriety by submitting to random UA's or breathalyzer's as requested by staff.   aumentará mi responsabilidad por la sobriedad al someterme a pruebas de detección de sustancias o pruebas de aliento aleatorias según lo solicite el personal. 8/5/25         May 21, 2025 I will understand the short and long term effects of withdrawal symptoms by attending PAWS group topic session and identify both acute and post acute symptoms I have or am currently experiencing  Entenderé los efectos a corto y mary plazo de los síntomas de abstinencia asistiendo a la sesión temática del " "timmy PAWS e identificaré tanto los síntomas agudos tian los post-agudos que tengo o que estoy experimentando actualmente. 25      Completed MV      DIMENSION 2: Biomedical Conditions/Complications - (Condiciones/Complicaciones Biomédicas)   Current Risk Ratin     Initial Risk Ratin  (Calificación de Riesgo Inicial: 1)     Identified areas of concern/focus:  Pre-diabetic.     As evidenced by:    Clients medical charts.     Client's Goal (Objetivo del cliente): \"To take care of Diabetes\" (\"Cuidar la Diabetes\")  Clinical Goals ([place] del clínico):  Client will gain insight into the importance of physical health and how physical health impacts our mental and chemical health. (El cliente obtendrá jessica comprensión de la importancia de la casie física y cómo la casie física impacta nuestra casie mental y química.)           Date/ Initials Methods/Interventions  (Métodos/Intervenciones)) Target Date Extended Date Extended Date Stopped Completed Initials   May 21, 2025 I will practice self care by following up with my PCP for any/all biomedical concerns and/or routine visits as and follow any/all recommendations for medications  Practicaré el cuidado personal haciendo un seguimiento con mi médico de atención primaria para cualquier preocupación biomédica y/o visitas de rutina, y seguiré todas las recomendaciones para los medicamentos. 8/3/25         May 21, 2025 I will practice self care by walking my dog for 1/2 mile 4x/wk.    Practicaré el autocuidado paseando a mi christelle breanna 1/2 lópez 4 veces a la semana. 25      Completed MV   May 21, 2025 I will improve self care habits by eating at least one salad/ 5x/wk  Voy a mejorar mis hábitos de autocuidado comiendo al menos jessica ensalada al día. 25      Completed MV   May 21, 2025 I will improve sleep habits by getting into bed by 11pm each night.   Mejoraré mis hábitos de sueño y me acostaré a las 11 de la noche cada noche. 25      Completed MV    " "25  I will improve my physical health by running at least 30 min 1x/wk. And share progress with staff during individual session. (Mejoraré mi casie física corriendo al menos 30 minutos 2 veces por semana. Y compartiré el progreso con el personal breanna las sesiones individuales).  25 I will cut down sugary drinks to 1 glass/wk. (Voy a reducir las bebidas azucaradas a 1 vaso por semana.) 25     Completed MV   25 I will cut down nicotine use by limiting myself to no more than 3 cigarettes/day. (Reduciré el consumo de nicotina limitándome a no más de 3 cigarrillos por semana.) 25     Completed MV   25  I will cut down nicotine use by limiting myself to no more than 2 cigarettes/day. (Reduciré el consumo de nicotina limitándome a no más de 2 cigarrillos por semana.) 8/15/25      7/22/25 I will improve balance of self care habits by completing self care inventory and 25 SMART goals relating to each area scoring low in and share progress with counselor during individual sessions. (Mejoraré el equilibrio de los hábitos de autocuidado completando un inventario de autocuidado y 25 objetivos SMART relacionados con cada área con puntuación baja y compartiré el progreso con el consejero breanna sesiones individuales.)   8/15/25         DIMENSION 3:Emotional/Behavioral Conditions/Complications (Condiciones/Complicaciones Emocionales/Conductuales)   Current Ratin     Initial Risk Ratin  (Calificación de Riesgo Inicial: 2)        Identified areas of concern/focus:   No history of MH     As evidenced by:    Patient Reports     Client's Goal (Objetivo del cliente): \"To be more open about my feelings to others\" (Ser más abierto sobre mis sentimientos hacia los demás\")  Clinical Goals ([place] del clínico):  Client will gain insight into mental health symptoms and be able to identify and utilize healthy strategies to cope with mental health symptoms (El cliente adquirirá información " sobre los síntomas de casie mental y podrá identificar y utilizar estrategias saludables para hacer frente a los síntomas de casie mental.)  Client will strengthen protective factors.  Must be reached in order to have services terminated?  Yes  Target Date: 8/3/25   (El cliente fortalecerá los factores de protección.  Se debe alcanzar para que se terminen los servicios? Sí, fecha objetivo:)           Date/ Initials Methods/Interventions (Métodos/Intervenciones) Target Date Extended Date Extended Date Stopped Completed Initials   May 21, 2025 I gain awareness of mental health symptoms by identifying specific mental health symptoms I am experiencing, each week and share with staff and peers during group sessions.   (Aumentaré mi conciencia sobre los síntomas de casie mental identificando síntomas específicos de casie mental que estoy experimentando cada semana y compartiré con el personal y compañeros breanna las sesiones grupales.) 8/3/25            May 21, 2025 I will practice healthly strategies to cope with emotions by communicating my feeling with my partner for 10 min at least 5x's/week  (Voy a practicar estrategias saludables para manejar las emociones comunicando mis sentimientos con mi elsie 5 veces a la semana, cuando tiburcio me recoge del trabajo.) 25      Completed MV    25  I will improve emotion regulation skills by calling either a supportive friend or wife after a stressful day at work and discuss progress with counselor during individual session.   (Mejoraré mis habilidades de regulación de emociones llamando a un amigo o esposa que me apoye después de un día estresante en el trabajo y discutiré el progreso con un consejero breanna jessica sesión individual.)    25                                           DIMENSION 4: Treatment Acceptance/Resistance - (Aceptación/Resistencia al Tratamiento)   Current Risk Ratin     Initial Risk Ratin  (Calificación de Riesgo Inicial: 2)       "  Identified areas of concern/focus:    Alcohol Use Disorders;  303.90 (F10.20) Alcohol Use Disorder Severe  Moderate motivation for treatment     As evidenced by:  Meets DSM 5 criteria for substance use disorder.  Court ordered treatment.     Client's Goal (Objetivo del cliente): \"To complete this program\"  Clinical Goals ([place] del clínico):  Client will gain insight into the value of Acceptance and Surrender as well as increasing internal motivation for change. (El cliente obtendrá jessica comprensión del valor de la Aceptación y la Rendición, así tian un aumento en la motivación interna para el cambio.)        Date/ Initials Methods/Interventions  (Métodos/Intervenciones) Target Date Extended Date Extended Date Stopped Completed Initials   May 19, 2025 Client will meet individually with [facility] on a bi-weekly basis to review and update treatment plan goals, progress, and/or concerns  El cliente se reunirá individualmente con [facility] de manera quincenal para revisar y actualizar los objetivos del plan de tratamiento, el progreso y/o las preocupaciones.  8/3/25            May 19, 2025 Client will attend LADC led AMISH group 2x/wk for a total of 6 hours, during phase II.  El cliente asistirá al timmy AMISH dirigido por LADC 2 veces por semana breanna un total de 6 horas, breanna la fase II.  7/1/25      Completed MV   May 19, 2025 Client will increase motivation for change by identifying 10 harmful consequences relating to use and share insight with peers and staff during group session.  El cliente aumentará la motivación para el cambio identificando 10 consecuencias perjudiciales relacionadas con el uso y compartiendo ideas con compañeros y personal breanna la sesión grupal.  6/11/25      Completed MV   7/1/25 Client will attend LADC led AMISH group 1x/wk for a total of 2 hours, during phase III.  El cliente asistirá al timmy AMISH dirigido por LADC 1 undia a las  semana breanna un total de 2 horas, breanna la fase III.  8/23/25     " "          DIMENSION 5: Relapse/Continued Problem Potential - (Recaída/Potencial de Problemas Continuos)   Current Risk: 1     Initial Risk Rating: 3  (Calificación de Riesgo Inicial: 3)        Identified areas of concern/focus:  Lack of knowledge/coping skills related to to relapse triggers and coping strategies     As Evidenced by:  Client lacks coping skills for relapse prevention.        Client's Goal (Objetivo del cliente): \"To keep alcohol out of my home\"  Clinical Goals ([place] del clínico: Client will gain insight into personal early warning signs, triggers, and urges as well as develop healthy strategies to reduce risk of relapse/continued use. (El cliente obtendrá información sobre las señales de advertencia personales, los desencadenantes y los impulsos, así tian desarrollará estrategias saludables para reducir el riesgo de recaída o uso continuo).     Client has established and utilizes a relapse prevention plan.  Must be reached in order to have services terminated? (El cliente ha establecido y utiliza un plan de prevención de recaídas.  Debe ser alcanzado para que se terminen los servicios?.)  Must be reached in order to have services terminated? Yes    Target Date: 8/3/25            Date/ Initials Methods/Interventions  (Métodos/Intervenciones) Target Date Extended Date Extended Date Stopped Completed Initials      May 21, 2025 Client will increase awareness of personal triggers and cravings by rating his cravings and verbalizing triggers he has experienced each week during group.   El cliente aumentará la conciencia sobre lila desencadenantes y antojos personales al calificar lila antojos y verbalizar los desencadenantes que fong experimentado cada semana breanna el timmy. 8/3/25         May 21, 2025 Client will increase awareness of personal triggers by completing assignment on  Internal and external triggers, and share insight with peers and staff during group session.  El cliente aumentará la conciencia " "de lila desencadenantes personales al completar jessica tarea sobre desencadenantes internos y externos, y compartirá lila ideas con compañeros y personal breanna la sesión grupal. 25    Completed MV   25 I will reduce risk of relapse by asserting boundaries with people in my life that I used to drink with.   Reduciré el riesgo de recaída estableciendo límites con las personas en mi curtis con las que solía beber. 25      Completed MV   25 I will develop healthier after work habits by coming home and practice self care and quality time with children.   Desarrollará hábitos más saludables después del trabajo al llegar a casa y practicar el cuidado personal y el tiempo de calidad con los niños. 25      Completed MV   25 I will develop a healthy recovery care plan by completing handouts relating to each significant area of my life and share insight with peers and staff during group process.   (Desarrollaré un plan de atención de recuperación saludable completando folletos relacionados con cada área significativa de mi curtis y compartiré conocimientos con mis compañeros y el personal breanna el proceso grupal.) 25            DIMENSION 6: Recovery Environment - (Entorno de recuperación)   Current Risk Ratin     Initial Risk Ratin  (Calificación de Riesgo Inicial: 2)     Identified areas of concern/focus: Lack of sober support (falta de apoyo sobrio)  Chemical use by peer group  Lack of sober / recreational interests  Legal issues     As evidenced by:    Client reports most peer group uses.    Clients lacks sober activities.       Client's Goal (Objetivo del cliente): \"I will increase time with family and spend more time at home.\" (\"Aumentaré el tiempo con la therese y pasaré más tiempo en casa\".)  Clinical Goals ([place] del clínico:): To develop healthier habits and improve relationships with his family. (Desarrollar hábitos más saludables y mejorar las relaciones con vásquez therese.)   "   Establish a transition plan connecting to culturally informed services in the community for post-treatment follow up care.  Must be reached in order to have services terminated?  Yes  Target Date:  8/3/25  (Establecer un plan de transición que conecte con servicios culturalmente informados en la comunidad para el seguimiento post-tratamiento.  Debe alcanzarse para que los servicios se terminen? Sí Fecha objetivo: **)           Date/ Initials Methods/Interventions  (Métodos/Intervenciones) Target Date Extended Date Extended Date Stopped Completed Initials   May 21, 2025 Client will increase daily structure and routine by participating in at least 1 sober recreational activity per week.   El cliente aumentará la estructura y la rutina diaria al participar en al menos 1 actividad recreativa sobria por semana. 7/10/25      Completed MV      6/4/25 I will attend Buddhism with my family 1x/wk.   (Asistiré a la keaton con mi therese jessica vez por semana.) 8/1/25      Completed MV   6/4/25 I will spend quality time with my son at least 1x/wk.   (Pasaré tiempo de calidad con mi hijo al menos 1 vez por semana.) 8/15/25      Completed MV   6/4/25 I will share thoughts and feelings with my mom about how well I am doing since not drinking. by video chatting   (Seré abierto y honesto con mi mamá respecto a las preocupaciones sobre el consumo de alcohol mediante jessica videollamada.)    7/4/25  8/10/25    Completed MV   6/4/25 I will practice better self habits at work by taking at least one 15 min. Break each work shift.  (Practicaré mejores hábitos personales en el trabajo tomando al menos un descanso de 15 minutos en cada turno.)  7/4/25 8/12/25    Completed MV   6/18/25 I will attend at least 1  self help meeting and share insight with staff during individual session  (Asistiré a al menos jessica reunión de autoayuda  y compartiré mis ideas con el personal breanna la sesión individual). 7/18/25 8/12/25                   Client Strengths: [place] del Cliente: Motivated, hardworking, family oriented, intelligent, and willingness for change. (Motivado, trabajador, orientado a la therese, inteligente y con disposición para el cambio.) Client Treatment Plan Adaptations: Adaptaciones del Plan de Tratamiento del Cliente:     Client does not need adjustments at this time.(El cliente no necesita ajustes en jewels momento.)     The following adjustments will be made based on the above identified plan: (Se realizarán los siguientes ajustes según el plan identificado anteriormente:) NA     The following staff have contributed to this plan: FADUMO Horta, [facility]  (El siguiente personal ha contribuido a jewels plan):          Were family/support people involved in the treatment planning?  No - List reason why not:  No specific reason.   ( Estuvieron involucrados familiares/personas de apoyo en la planificación del tratamiento? No - Motivo por el cual no: No hay un motivo específico.)     Resources  Resources to which the patient is being referred for problems when they are to be addressed concurrently by another provider: No Referrals Needed     [x] Contact insurance to ensure referrals are in network     Vulnerable Adult Review   [x] Review of the facility Abuse Prevention plan was reviewed with the patient   [x] No individual abuse plan is necessary   [] In addition to the facility Abuse Prevention plan, an Individual Abuse Plan will be put in place       Hal Landa attests their date of last use as 4/4/25. Client attests they have participated in the creation of this treatment plan. Hal Landa has been provided a copy of this treatment plan and is in agreement with how this plan is written and will be stored in the electronic record.   (Hal Landa atestigua vásquez fecha de último uso tian 4/4/25. El cliente atestigua que ha participado en la creación de jewels plan de tratamiento. Se le ha proporcionado a  Hal Rahman Landa jessica copia de jewels plan de tratamiento y está de acuerdo con la forma en que está redactado jewels plan y se almacenará en el registro electrónico.)     Client Signature:  _______________________________  Date: ____________________     LADC Signature:  _______________________________  Date: ____________________

## 2025-07-22 NOTE — PROGRESS NOTES
Individual Session Summary   START TIME: 11:00AM   END TIME: 11:50AM   Duration: 50 min    Hal Landa is a 39 year old male who is being evaluated via in person visit.      Substance Use  Diagnosis:  Alcohol Use Disorder, Severe     Mental Health Diagnosis:  No history of MH diagnosis    Data:  Met with client on this date for an individual session focusing on reviewing and updating all 6 dimensions of their individual treatment plan. Client discussed a work conflict situation and asked how he could have handled the situation in a more effective way. He identified the need to keep work situations at work so he does not bring those feelings home with him, a relevant goal was added to dimension 3 of his treatment plan. Goals were also added to dimension 2. Client share positive changes he has noticed since starting the program.     Intervention: 6 dimensions, Q and A, DBT, MI, conflict resolution, life skills, goal oriented     Assessment: Client actively participated in the session and shared appropriate feedback     Plan. Client will continue to complete any/all methods/strategies outlined in their TP.         7/15/2025     2:00 PM   Suicide Ideation Check In   Since last session, how often have you had suicidal thoughts? No thoughts of suicide         Attestation: Haven Wise MD- Provides oversight and supervision of care.    SILVERIO Horta

## 2025-07-22 NOTE — PROGRESS NOTES
Addiction Outpatient Weekly Clinical Staffing     Hal Landa was staffed on 7/22/2025 . Hal Landa was staffed on recovery strengths, barriers and treatment progress.     Staff present: LETY Vazquez , LETY Horta , Arelis Rios MS, [facility] , Lauren Woods [facility], [facility] , Fifi Reyna [facility], [facility] , Jackie Valenzuela [facility], [facility] , and Brianne Vigil, [facility]     Date: 7/22/2025 Time: 3:28 PM    Staff Signature: LETY Horta

## 2025-07-22 NOTE — ADDENDUM NOTE
Encounter addended by: Lisette Handy LADC on: 7/22/2025 3:36 PM   Actions taken: Clinical Note Signed

## 2025-07-22 NOTE — GROUP NOTE
Group Therapy Documentation    PATIENT'S NAME: Hal Landa  MRN:   [id number]  :   1985  ACCT. NUMBER: [id number]  DATE OF SERVICE: 25  START TIME:  9:00 AM  END TIME: 11:00 AM  FACILITATOR(S): Lisette Handy LADC  TOPIC: BEH Group Therapy  Number of patients attending the group:  4  Group Length:  2 Hours    Dimensions addressed: 1, 2, 3, 4, 5, and 6    Summary of Group / Topics Discussed:    Recovery Management Plan- Relationships :-  Client completed Recovery Management Plan handout which develops a plan to help successfully manage recovery. A recovery plan can help control addictive behaviors and prevent relapse by address the four important elements: professional counseling, stress management, proper diet/health/exercise, and support groups. Client engaged in discussion with group regarding their willingness to engage in recommendations, successes in the past, benefits of each recommendation, and consequences of refusing recommendations.     Group Objectives:  Client will complete their recovery management handout to develop plan for remaining successful with their recovery goals outside of treatment  Client will identify the importance of having a recovery plan to promote wellness and avoid reverting to previous, unhealthy behaviors  Client will have the opportunity to learn from peers and their recovery plans to help with creativity when developing their own plan    Group Attendance:  Attended group session with in-person     Patient's response to the group topic/interactions:  cooperative with task, discussed personal experience with topic, expressed understanding of topic, gave appropriate feedback to peers, and listened actively    Patient appeared to be Actively participating, Attentive, and Engaged.        Client specific details:  Client shared weekly check-in questions rating the following on a scale of 0-10 (10 being highest) Depression 3, Anxiety 4, Motivation  7, and Cravings 2. No use episodes or concerns reported. He endorsed something fun he did this past weekend being: Going fishing. I positive affirmation being: I am good enough. Client took time during group to answer and share responses relating to relationship portion of their recovery care plan. Client was then given the next section of his recovery care plan to complete for next group session (Motivation).         7/15/2025     2:00 PM   Suicide Ideation Check In   Since last session, how often have you had suicidal thoughts? No thoughts of suicide       Lisette Handy, FADUMO, LADC

## 2025-07-24 NOTE — ADDENDUM NOTE
Encounter addended by: Lisette Handy LADC on: 7/24/2025 8:01 AM   Actions taken: Charge Capture section accepted

## 2025-07-29 ENCOUNTER — HOSPITAL ENCOUNTER (OUTPATIENT)
Facility: CLINIC | Age: 40
Discharge: HOME | End: 2025-07-29
Attending: FAMILY MEDICINE
Payer: COMMERCIAL

## 2025-07-29 PROCEDURE — H2035 A/D TX PROGRAM, PER HOUR: HCPCS | Mod: HQ

## 2025-07-29 PROCEDURE — H2035 A/D TX PROGRAM, PER HOUR: HCPCS

## 2025-07-29 NOTE — GROUP NOTE
Group Therapy Documentation    PATIENT'S NAME: Hal Landa  MRN:   [id number]  :   1985  ACCT. NUMBER: [id number]  DATE OF SERVICE: 25  START TIME:  9:00 AM  END TIME: 11:00 AM  FACILITATOR(S): Lisette Hadny LADC  TOPIC: BEH Group Therapy  Number of patients attending the group:  4  Group Length:  2 Hours    Dimensions addressed: 1, 2, 3, 4, 5, and 6    Summary of Group / Topics Discussed: -     Recovery Management Plan-Motivation for change:  Client completed Recovery Management Plan handout which develops a plan to help successfully manage recovery. A recovery plan can help control addictive behaviors and prevent relapse by address the four important elements: professional counseling, stress management, proper diet/health/exercise, and support groups. Client engaged in discussion with group regarding their willingness to engage in recommendations, successes in the past, benefits of each recommendation, and consequences of refusing recommendations.     Group Objectives:  Client will complete their recovery management handout to develop plan for remaining successful with their recovery goals outside of treatment  Client will identify the importance of having a recovery plan to promote wellness and avoid reverting to previous, unhealthy behaviors  Client will have the opportunity to learn from peers and their recovery plans to help with creativity when developing their own plan    Group Attendance:  Attended group session     Patient's response to the group topic/interactions:  cooperative with task, discussed personal experience with topic, expressed understanding of topic, gave appropriate feedback to peers, and listened actively     Patient appeared to be Actively participating, Attentive, and Engaged.         Client specific details:  Client shared weekly check-in questions rating the following on a scale of 0-10 (10 being highest) Depression 0, Anxiety 0, Motivation 10, and Cravings  "0. No use episodes or concerns reported. He identified two things he's good at: working and being a father. One accomplishment this past week: Cleaned the house. Two affirmations: I am patient and I am a good father. Client took turns sharing feedback relating to his completed \"Motivation\" section of his recovery care plan. He was given the final section of his recovery care plan (Relapse Prevention- Part 2) to complete before next group section.             7/29/2025    12:00 PM   Suicide Ideation Check In   Since last session, how often have you had suicidal thoughts? No thoughts of suicide        Lisette Handy, [facility]ADUMO [facility]  "

## 2025-07-29 NOTE — PROGRESS NOTES
Individual Session Summary   START TIME: 11:00AM   END TIME: 11:50AM   Duration: 50 min    Hal Landa is a 39 year old male who is being evaluated via in person visit.      Substance Use  Diagnosis:  Alcohol Use Disorder, Severe     Mental Health Diagnosis:  No history of MH diagnosis       Data:  Met with client on this date for an individual session focusing on gaining insight into the following:      Intervention: Q and A, MI, DBT, CBT, Affirmations, values inventory     Assessment: Client completed a values inventory identifying his top values being: Loyalty to Family, responsibility, and spirituality and low value being independence. He was able to identify how negatively alcohol impacts each of his values.      Plan. Client will be mindful of his values when triggered by un healthy thoughts/urges.         7/29/2025    12:00 PM   Suicide Ideation Check In   Since last session, how often have you had suicidal thoughts? No thoughts of suicide       Attestation: Haven Wise MD- Provides oversight and supervision of care.    LETY Horta

## 2025-07-30 NOTE — ADDENDUM NOTE
Encounter addended by: Ev Rios LADC on: 7/30/2025 3:15 PM   Actions taken: Charge Capture section accepted

## 2025-08-05 ENCOUNTER — HOSPITAL ENCOUNTER (OUTPATIENT)
Facility: CLINIC | Age: 40
Discharge: HOME | End: 2025-08-05
Attending: FAMILY MEDICINE
Payer: COMMERCIAL

## 2025-08-05 PROCEDURE — H2035 A/D TX PROGRAM, PER HOUR: HCPCS | Mod: HQ

## 2025-08-05 PROCEDURE — H2035 A/D TX PROGRAM, PER HOUR: HCPCS

## 2025-08-12 ENCOUNTER — HOSPITAL ENCOUNTER (OUTPATIENT)
Facility: CLINIC | Age: 40
Discharge: HOME | End: 2025-08-12
Attending: FAMILY MEDICINE
Payer: COMMERCIAL

## 2025-08-12 PROCEDURE — H2035 A/D TX PROGRAM, PER HOUR: HCPCS

## 2025-08-19 ENCOUNTER — HOSPITAL ENCOUNTER (OUTPATIENT)
Facility: CLINIC | Age: 40
Discharge: HOME | End: 2025-08-19
Attending: FAMILY MEDICINE
Payer: COMMERCIAL

## 2025-08-19 ENCOUNTER — BEH TREATMENT PLAN (OUTPATIENT)
Dept: BEHAVIORAL HEALTH | Facility: CLINIC | Age: 40
End: 2025-08-19
Payer: COMMERCIAL

## 2025-08-19 PROCEDURE — H2035 A/D TX PROGRAM, PER HOUR: HCPCS

## 2025-08-19 PROCEDURE — T1013 SIGN LANG/ORAL INTERPRETER: HCPCS | Mod: U4

## 2025-08-19 ASSESSMENT — ANXIETY QUESTIONNAIRES
5. BEING SO RESTLESS THAT IT IS HARD TO SIT STILL: NOT AT ALL
4. TROUBLE RELAXING: NOT AT ALL
IF YOU CHECKED OFF ANY PROBLEMS ON THIS QUESTIONNAIRE, HOW DIFFICULT HAVE THESE PROBLEMS MADE IT FOR YOU TO DO YOUR WORK, TAKE CARE OF THINGS AT HOME, OR GET ALONG WITH OTHER PEOPLE: NOT DIFFICULT AT ALL
6. BECOMING EASILY ANNOYED OR IRRITABLE: NOT AT ALL
GAD7 TOTAL SCORE: 2
GAD7 TOTAL SCORE: 2
7. FEELING AFRAID AS IF SOMETHING AWFUL MIGHT HAPPEN: NOT AT ALL
3. WORRYING TOO MUCH ABOUT DIFFERENT THINGS: NOT AT ALL
2. NOT BEING ABLE TO STOP OR CONTROL WORRYING: SEVERAL DAYS
1. FEELING NERVOUS, ANXIOUS, OR ON EDGE: SEVERAL DAYS

## 2025-08-19 ASSESSMENT — COLUMBIA-SUICIDE SEVERITY RATING SCALE - C-SSRS
1. SINCE LAST CONTACT, HAVE YOU WISHED YOU WERE DEAD OR WISHED YOU COULD GO TO SLEEP AND NOT WAKE UP?: NO
2. HAVE YOU ACTUALLY HAD ANY THOUGHTS OF KILLING YOURSELF?: NO

## 2025-08-19 ASSESSMENT — PATIENT HEALTH QUESTIONNAIRE - PHQ9: SUM OF ALL RESPONSES TO PHQ QUESTIONS 1-9: 0
